# Patient Record
Sex: FEMALE | Race: WHITE | Employment: UNEMPLOYED | URBAN - METROPOLITAN AREA
[De-identification: names, ages, dates, MRNs, and addresses within clinical notes are randomized per-mention and may not be internally consistent; named-entity substitution may affect disease eponyms.]

---

## 2017-01-16 ENCOUNTER — TELEPHONE (OUTPATIENT)
Dept: CARDIOLOGY | Age: 37
End: 2017-01-16

## 2017-01-16 RX ORDER — LISINOPRIL 20 MG/1
20 TABLET ORAL DAILY
Qty: 30 TABLET | Refills: 0 | Status: SHIPPED | OUTPATIENT
Start: 2017-01-16 | End: 2018-09-05 | Stop reason: ALTCHOICE

## 2018-09-05 ENCOUNTER — HOSPITAL ENCOUNTER (INPATIENT)
Age: 38
LOS: 4 days | Discharge: HOME | DRG: 194 | End: 2018-09-09
Attending: EMERGENCY MEDICINE | Admitting: FAMILY MEDICINE
Payer: MEDICAID

## 2018-09-05 ENCOUNTER — APPOINTMENT (OUTPATIENT)
Dept: GENERAL RADIOLOGY | Age: 38
DRG: 194 | End: 2018-09-05
Payer: MEDICAID

## 2018-09-05 ENCOUNTER — APPOINTMENT (OUTPATIENT)
Dept: ULTRASOUND IMAGING | Age: 38
DRG: 194 | End: 2018-09-05
Payer: MEDICAID

## 2018-09-05 DIAGNOSIS — I16.0 HYPERTENSIVE URGENCY: ICD-10-CM

## 2018-09-05 DIAGNOSIS — R73.01 ELEVATED FASTING GLUCOSE: ICD-10-CM

## 2018-09-05 DIAGNOSIS — I50.9 ACUTE CONGESTIVE HEART FAILURE, UNSPECIFIED HEART FAILURE TYPE (HCC): Primary | ICD-10-CM

## 2018-09-05 DIAGNOSIS — I16.1 HYPERTENSIVE EMERGENCY: ICD-10-CM

## 2018-09-05 DIAGNOSIS — J81.0 ACUTE PULMONARY EDEMA (HCC): ICD-10-CM

## 2018-09-05 PROBLEM — D64.9 ANEMIA: Status: ACTIVE | Noted: 2018-09-05

## 2018-09-05 PROBLEM — R06.00 DYSPNEA: Status: ACTIVE | Noted: 2018-09-05

## 2018-09-05 PROBLEM — I50.33 ACUTE ON CHRONIC DIASTOLIC HEART FAILURE (HCC): Status: ACTIVE | Noted: 2018-09-05

## 2018-09-05 PROBLEM — E28.2 POLYCYSTIC OVARIAN DISEASE: Status: ACTIVE | Noted: 2018-09-05

## 2018-09-05 PROBLEM — R60.9 EDEMA: Status: ACTIVE | Noted: 2018-09-05

## 2018-09-05 PROBLEM — E66.9 OBESITY: Status: ACTIVE | Noted: 2018-09-05

## 2018-09-05 LAB
A/G RATIO: 1.3 (ref 1.1–2.2)
ALBUMIN SERPL-MCNC: 4 G/DL (ref 3.4–5)
ALP BLD-CCNC: 74 U/L (ref 40–129)
ALT SERPL-CCNC: 8 U/L (ref 10–40)
AMPHETAMINE SCREEN, URINE: NORMAL
ANION GAP SERPL CALCULATED.3IONS-SCNC: 14 MMOL/L (ref 3–16)
AST SERPL-CCNC: 14 U/L (ref 15–37)
BARBITURATE SCREEN URINE: NORMAL
BASOPHILS ABSOLUTE: 0.1 K/UL (ref 0–0.2)
BASOPHILS RELATIVE PERCENT: 0.8 %
BENZODIAZEPINE SCREEN, URINE: NORMAL
BILIRUB SERPL-MCNC: 1.3 MG/DL (ref 0–1)
BILIRUBIN URINE: NEGATIVE
BLOOD, URINE: NEGATIVE
BUN BLDV-MCNC: 19 MG/DL (ref 7–20)
CALCIUM SERPL-MCNC: 9.1 MG/DL (ref 8.3–10.6)
CANNABINOID SCREEN URINE: NORMAL
CHLORIDE BLD-SCNC: 103 MMOL/L (ref 99–110)
CLARITY: CLEAR
CO2: 21 MMOL/L (ref 21–32)
COCAINE METABOLITE SCREEN URINE: NORMAL
COLOR: NORMAL
CREAT SERPL-MCNC: 1 MG/DL (ref 0.6–1.1)
CREATININE URINE: 8.8 MG/DL (ref 28–259)
EOSINOPHILS ABSOLUTE: 0.1 K/UL (ref 0–0.6)
EOSINOPHILS RELATIVE PERCENT: 0.8 %
FOLATE: 12.48 NG/ML (ref 4.78–24.2)
GFR AFRICAN AMERICAN: >60
GFR NON-AFRICAN AMERICAN: >60
GLOBULIN: 3.1 G/DL
GLUCOSE BLD-MCNC: 126 MG/DL (ref 70–99)
GLUCOSE URINE: NEGATIVE MG/DL
GONADOTROPIN, CHORIONIC (HCG) QUANT: <5 MIU/ML
HCG QUALITATIVE: NEGATIVE
HCT VFR BLD CALC: 33.3 % (ref 36–48)
HEMOGLOBIN: 9.6 G/DL (ref 12–16)
IRON SATURATION: 6 % (ref 15–50)
IRON: 27 UG/DL (ref 37–145)
KETONES, URINE: NEGATIVE MG/DL
LEUKOCYTE ESTERASE, URINE: NEGATIVE
LYMPHOCYTES ABSOLUTE: 1.1 K/UL (ref 1–5.1)
LYMPHOCYTES RELATIVE PERCENT: 9.5 %
Lab: NORMAL
MCH RBC QN AUTO: 20.7 PG (ref 26–34)
MCHC RBC AUTO-ENTMCNC: 29 G/DL (ref 31–36)
MCV RBC AUTO: 71.3 FL (ref 80–100)
METHADONE SCREEN, URINE: NORMAL
MICROALBUMIN UR-MCNC: 4.6 MG/DL
MICROALBUMIN/CREAT UR-RTO: 522.7 MG/G (ref 0–30)
MICROSCOPIC EXAMINATION: NORMAL
MONOCYTES ABSOLUTE: 0.6 K/UL (ref 0–1.3)
MONOCYTES RELATIVE PERCENT: 5.4 %
NEUTROPHILS ABSOLUTE: 9.9 K/UL (ref 1.7–7.7)
NEUTROPHILS RELATIVE PERCENT: 83.5 %
NITRITE, URINE: NEGATIVE
OPIATE SCREEN URINE: NORMAL
OXYCODONE URINE: NORMAL
PDW BLD-RTO: 22.9 % (ref 12.4–15.4)
PH UA: 6.5
PH UA: 7
PHENCYCLIDINE SCREEN URINE: NORMAL
PLATELET # BLD: 244 K/UL (ref 135–450)
PMV BLD AUTO: 9.2 FL (ref 5–10.5)
POTASSIUM SERPL-SCNC: 4.2 MMOL/L (ref 3.5–5.1)
PRO-BNP: 4762 PG/ML (ref 0–124)
PROPOXYPHENE SCREEN: NORMAL
PROTEIN UA: NEGATIVE MG/DL
RBC # BLD: 4.66 M/UL (ref 4–5.2)
SODIUM BLD-SCNC: 138 MMOL/L (ref 136–145)
SPECIFIC GRAVITY UA: <=1.005
TOTAL IRON BINDING CAPACITY: 444 UG/DL (ref 260–445)
TOTAL PROTEIN: 7.1 G/DL (ref 6.4–8.2)
TROPONIN: <0.01 NG/ML
URINE TYPE: NORMAL
UROBILINOGEN, URINE: 0.2 E.U./DL
VITAMIN B-12: 726 PG/ML (ref 211–911)
WBC # BLD: 11.8 K/UL (ref 4–11)

## 2018-09-05 PROCEDURE — 99285 EMERGENCY DEPT VISIT HI MDM: CPT

## 2018-09-05 PROCEDURE — 99291 CRITICAL CARE FIRST HOUR: CPT | Performed by: INTERNAL MEDICINE

## 2018-09-05 PROCEDURE — 6360000002 HC RX W HCPCS: Performed by: NURSE PRACTITIONER

## 2018-09-05 PROCEDURE — 6370000000 HC RX 637 (ALT 250 FOR IP): Performed by: INTERNAL MEDICINE

## 2018-09-05 PROCEDURE — 6370000000 HC RX 637 (ALT 250 FOR IP): Performed by: NURSE PRACTITIONER

## 2018-09-05 PROCEDURE — 71046 X-RAY EXAM CHEST 2 VIEWS: CPT

## 2018-09-05 PROCEDURE — 83735 ASSAY OF MAGNESIUM: CPT

## 2018-09-05 PROCEDURE — 2000000000 HC ICU R&B

## 2018-09-05 PROCEDURE — 6370000000 HC RX 637 (ALT 250 FOR IP): Performed by: EMERGENCY MEDICINE

## 2018-09-05 PROCEDURE — 2500000003 HC RX 250 WO HCPCS: Performed by: NURSE PRACTITIONER

## 2018-09-05 PROCEDURE — 6360000002 HC RX W HCPCS: Performed by: EMERGENCY MEDICINE

## 2018-09-05 PROCEDURE — 96374 THER/PROPH/DIAG INJ IV PUSH: CPT

## 2018-09-05 PROCEDURE — 83540 ASSAY OF IRON: CPT

## 2018-09-05 PROCEDURE — 2580000003 HC RX 258: Performed by: FAMILY MEDICINE

## 2018-09-05 PROCEDURE — 2500000003 HC RX 250 WO HCPCS: Performed by: FAMILY MEDICINE

## 2018-09-05 PROCEDURE — 84270 ASSAY OF SEX HORMONE GLOBUL: CPT

## 2018-09-05 PROCEDURE — 83001 ASSAY OF GONADOTROPIN (FSH): CPT

## 2018-09-05 PROCEDURE — 36415 COLL VENOUS BLD VENIPUNCTURE: CPT

## 2018-09-05 PROCEDURE — 2500000003 HC RX 250 WO HCPCS: Performed by: INTERNAL MEDICINE

## 2018-09-05 PROCEDURE — 83036 HEMOGLOBIN GLYCOSYLATED A1C: CPT

## 2018-09-05 PROCEDURE — 80053 COMPREHEN METABOLIC PANEL: CPT

## 2018-09-05 PROCEDURE — 80307 DRUG TEST PRSMV CHEM ANLYZR: CPT

## 2018-09-05 PROCEDURE — 76770 US EXAM ABDO BACK WALL COMP: CPT

## 2018-09-05 PROCEDURE — 82607 VITAMIN B-12: CPT

## 2018-09-05 PROCEDURE — 83880 ASSAY OF NATRIURETIC PEPTIDE: CPT

## 2018-09-05 PROCEDURE — S0028 INJECTION, FAMOTIDINE, 20 MG: HCPCS | Performed by: NURSE PRACTITIONER

## 2018-09-05 PROCEDURE — 83550 IRON BINDING TEST: CPT

## 2018-09-05 PROCEDURE — 82043 UR ALBUMIN QUANTITATIVE: CPT

## 2018-09-05 PROCEDURE — 93005 ELECTROCARDIOGRAM TRACING: CPT | Performed by: EMERGENCY MEDICINE

## 2018-09-05 PROCEDURE — 81003 URINALYSIS AUTO W/O SCOPE: CPT

## 2018-09-05 PROCEDURE — 84702 CHORIONIC GONADOTROPIN TEST: CPT

## 2018-09-05 PROCEDURE — 84146 ASSAY OF PROLACTIN: CPT

## 2018-09-05 PROCEDURE — 84484 ASSAY OF TROPONIN QUANT: CPT

## 2018-09-05 PROCEDURE — 85025 COMPLETE CBC W/AUTO DIFF WBC: CPT

## 2018-09-05 PROCEDURE — 84703 CHORIONIC GONADOTROPIN ASSAY: CPT

## 2018-09-05 PROCEDURE — 82746 ASSAY OF FOLIC ACID SERUM: CPT

## 2018-09-05 PROCEDURE — 82570 ASSAY OF URINE CREATININE: CPT

## 2018-09-05 PROCEDURE — 83002 ASSAY OF GONADOTROPIN (LH): CPT

## 2018-09-05 PROCEDURE — 2580000003 HC RX 258: Performed by: NURSE PRACTITIONER

## 2018-09-05 PROCEDURE — 84403 ASSAY OF TOTAL TESTOSTERONE: CPT

## 2018-09-05 PROCEDURE — 6360000002 HC RX W HCPCS: Performed by: INTERNAL MEDICINE

## 2018-09-05 RX ORDER — ACETAMINOPHEN 325 MG/1
650 TABLET ORAL ONCE
Status: COMPLETED | OUTPATIENT
Start: 2018-09-05 | End: 2018-09-05

## 2018-09-05 RX ORDER — FUROSEMIDE 10 MG/ML
20 INJECTION INTRAMUSCULAR; INTRAVENOUS ONCE
Status: COMPLETED | OUTPATIENT
Start: 2018-09-05 | End: 2018-09-05

## 2018-09-05 RX ORDER — CLONIDINE HYDROCHLORIDE 0.1 MG/1
0.1 TABLET ORAL 3 TIMES DAILY
Status: DISCONTINUED | OUTPATIENT
Start: 2018-09-05 | End: 2018-09-09 | Stop reason: HOSPADM

## 2018-09-05 RX ORDER — FUROSEMIDE 10 MG/ML
40 INJECTION INTRAMUSCULAR; INTRAVENOUS 2 TIMES DAILY
Status: DISCONTINUED | OUTPATIENT
Start: 2018-09-05 | End: 2018-09-08

## 2018-09-05 RX ORDER — CALCIUM CARBONATE 200(500)MG
500 TABLET,CHEWABLE ORAL 3 TIMES DAILY PRN
Status: DISCONTINUED | OUTPATIENT
Start: 2018-09-05 | End: 2018-09-09 | Stop reason: HOSPADM

## 2018-09-05 RX ORDER — MORPHINE SULFATE 2 MG/ML
2 INJECTION, SOLUTION INTRAMUSCULAR; INTRAVENOUS ONCE
Status: COMPLETED | OUTPATIENT
Start: 2018-09-05 | End: 2018-09-05

## 2018-09-05 RX ORDER — HYDRALAZINE HYDROCHLORIDE 20 MG/ML
5 INJECTION INTRAMUSCULAR; INTRAVENOUS ONCE
Status: COMPLETED | OUTPATIENT
Start: 2018-09-05 | End: 2018-09-05

## 2018-09-05 RX ORDER — SODIUM CHLORIDE 0.9 % (FLUSH) 0.9 %
10 SYRINGE (ML) INJECTION PRN
Status: DISCONTINUED | OUTPATIENT
Start: 2018-09-05 | End: 2018-09-09 | Stop reason: HOSPADM

## 2018-09-05 RX ORDER — LABETALOL HYDROCHLORIDE 5 MG/ML
20 INJECTION, SOLUTION INTRAVENOUS EVERY 4 HOURS PRN
Status: DISCONTINUED | OUTPATIENT
Start: 2018-09-05 | End: 2018-09-07

## 2018-09-05 RX ORDER — HYDRALAZINE HYDROCHLORIDE 20 MG/ML
10 INJECTION INTRAMUSCULAR; INTRAVENOUS EVERY 6 HOURS PRN
Status: DISCONTINUED | OUTPATIENT
Start: 2018-09-05 | End: 2018-09-09 | Stop reason: HOSPADM

## 2018-09-05 RX ORDER — CAPSAICIN 0.025 %
CREAM (GRAM) TOPICAL 2 TIMES DAILY
Status: DISCONTINUED | OUTPATIENT
Start: 2018-09-05 | End: 2018-09-09 | Stop reason: HOSPADM

## 2018-09-05 RX ORDER — ONDANSETRON 2 MG/ML
4 INJECTION INTRAMUSCULAR; INTRAVENOUS EVERY 6 HOURS PRN
Status: DISCONTINUED | OUTPATIENT
Start: 2018-09-05 | End: 2018-09-09 | Stop reason: HOSPADM

## 2018-09-05 RX ORDER — SPIRONOLACTONE 25 MG/1
25 TABLET ORAL DAILY
Status: DISCONTINUED | OUTPATIENT
Start: 2018-09-05 | End: 2018-09-07

## 2018-09-05 RX ORDER — SODIUM CHLORIDE 0.9 % (FLUSH) 0.9 %
10 SYRINGE (ML) INJECTION EVERY 12 HOURS SCHEDULED
Status: DISCONTINUED | OUTPATIENT
Start: 2018-09-05 | End: 2018-09-09 | Stop reason: HOSPADM

## 2018-09-05 RX ADMIN — HYDRALAZINE HYDROCHLORIDE 5 MG: 20 INJECTION INTRAMUSCULAR; INTRAVENOUS at 13:38

## 2018-09-05 RX ADMIN — SODIUM CHLORIDE, PRESERVATIVE FREE 10 ML: 5 INJECTION INTRAVENOUS at 20:19

## 2018-09-05 RX ADMIN — FUROSEMIDE 20 MG: 10 INJECTION, SOLUTION INTRAMUSCULAR; INTRAVENOUS at 11:52

## 2018-09-05 RX ADMIN — NITROGLYCERIN 0.5 INCH: 20 OINTMENT TOPICAL at 12:32

## 2018-09-05 RX ADMIN — FAMOTIDINE 20 MG: 10 INJECTION, SOLUTION INTRAVENOUS at 20:19

## 2018-09-05 RX ADMIN — ACETAMINOPHEN 650 MG: 325 TABLET, FILM COATED ORAL at 13:38

## 2018-09-05 RX ADMIN — SPIRONOLACTONE 25 MG: 25 TABLET ORAL at 16:45

## 2018-09-05 RX ADMIN — LABETALOL HYDROCHLORIDE 20 MG: 5 INJECTION, SOLUTION INTRAVENOUS at 18:02

## 2018-09-05 RX ADMIN — ONDANSETRON HYDROCHLORIDE 4 MG: 2 INJECTION, SOLUTION INTRAMUSCULAR; INTRAVENOUS at 15:40

## 2018-09-05 RX ADMIN — ANTACID TABLETS 500 MG: 500 TABLET, CHEWABLE ORAL at 19:16

## 2018-09-05 RX ADMIN — ENOXAPARIN SODIUM 40 MG: 40 INJECTION SUBCUTANEOUS at 16:45

## 2018-09-05 RX ADMIN — FUROSEMIDE 40 MG: 10 INJECTION, SOLUTION INTRAMUSCULAR; INTRAVENOUS at 20:19

## 2018-09-05 RX ADMIN — MORPHINE SULFATE 2 MG: 2 INJECTION, SOLUTION INTRAMUSCULAR; INTRAVENOUS at 15:55

## 2018-09-05 RX ADMIN — DEXTROSE MONOHYDRATE 8 MG/HR: 50 INJECTION, SOLUTION INTRAVENOUS at 19:55

## 2018-09-05 RX ADMIN — CLONIDINE HYDROCHLORIDE 0.1 MG: 0.1 TABLET ORAL at 20:19

## 2018-09-05 RX ADMIN — DEXTROSE MONOHYDRATE 5 MG/HR: 50 INJECTION, SOLUTION INTRAVENOUS at 16:22

## 2018-09-05 ASSESSMENT — ENCOUNTER SYMPTOMS
PHOTOPHOBIA: 0
SPUTUM PRODUCTION: 0
SHORTNESS OF BREATH: 1
ABDOMINAL PAIN: 0
ORTHOPNEA: 0
COUGH: 0
BACK PAIN: 0
HEARTBURN: 0
DOUBLE VISION: 0
BLURRED VISION: 0
VOMITING: 0
NAUSEA: 0

## 2018-09-05 ASSESSMENT — PAIN DESCRIPTION - PAIN TYPE
TYPE: ACUTE PAIN
TYPE: ACUTE PAIN

## 2018-09-05 ASSESSMENT — PAIN DESCRIPTION - ORIENTATION: ORIENTATION: MID

## 2018-09-05 ASSESSMENT — PAIN DESCRIPTION - FREQUENCY: FREQUENCY: CONTINUOUS

## 2018-09-05 ASSESSMENT — PAIN SCALES - GENERAL
PAINLEVEL_OUTOF10: 7
PAINLEVEL_OUTOF10: 5
PAINLEVEL_OUTOF10: 0
PAINLEVEL_OUTOF10: 5

## 2018-09-05 ASSESSMENT — PAIN DESCRIPTION - LOCATION
LOCATION: HEAD
LOCATION: HEAD

## 2018-09-05 ASSESSMENT — PAIN DESCRIPTION - PROGRESSION: CLINICAL_PROGRESSION: NOT CHANGED

## 2018-09-05 ASSESSMENT — PAIN DESCRIPTION - DESCRIPTORS: DESCRIPTORS: ACHING

## 2018-09-05 ASSESSMENT — PAIN DESCRIPTION - ONSET: ONSET: SUDDEN

## 2018-09-05 NOTE — PROGRESS NOTES
Pt. Assessed, up to chair, a/o x4, vss on RA, cardene drip infusing @ 8mg/hr, will titrate as able. Pt. verbalizing no needs at this time.

## 2018-09-05 NOTE — CONSULTS
and nosebleeds. Eyes: Negative for blurred vision, double vision and photophobia. Respiratory: Positive for shortness of breath. Negative for cough and sputum production. Cardiovascular: Negative for chest pain, palpitations and orthopnea. Gastrointestinal: Negative for abdominal pain, heartburn, nausea and vomiting. Genitourinary: Negative for dysuria, frequency and urgency. Musculoskeletal: Negative for back pain, myalgias and neck pain. Neurological: Negative for dizziness, focal weakness, loss of consciousness and headaches. Endo/Heme/Allergies: Negative for environmental allergies and polydipsia. Does not bruise/bleed easily. Objective:   PHYSICAL EXAM:  BP (!) 190/104   Pulse 85   Temp 97.5 °F (36.4 °C) (Oral)   Resp 22   Ht 5' 7\" (1.702 m)   Wt (!) 320 lb 5.3 oz (145.3 kg)   LMP 08/27/2018 (Approximate) Comment: 2 weeks ago   SpO2 98%   BMI 50.17 kg/m²    Physical Exam   Constitutional: She appears well-developed and well-nourished. No distress. HENT:   Head: Normocephalic and atraumatic. Mouth/Throat: Oropharynx is clear and moist. No oropharyngeal exudate. Eyes: Pupils are equal, round, and reactive to light. EOM are normal.   Neck: Neck supple. No JVD present. Cardiovascular: Normal heart sounds. Exam reveals no gallop and no friction rub. No murmur heard. Pulmonary/Chest: Effort normal. She has no wheezes. She has no rales. Equal chest rise and expansion bilaterally   Abdominal: Soft. Bowel sounds are normal. She exhibits no distension. There is no tenderness. Musculoskeletal: Normal range of motion. She exhibits no edema. Lymphadenopathy:     She has no cervical adenopathy. Neurological: She is alert. No cranial nerve deficit. CN 2-12 grossly intact   Skin: Skin is warm and dry. No rash noted. She is not diaphoretic.           Data Reviewed:   LABS:  CBC:   Recent Labs      09/05/18   0934   WBC  11.8*   HGB  9.6*   HCT  33.3*   MCV  71.3*   PLT  244

## 2018-09-05 NOTE — H&P
Hospital Medicine History & Physical      PCP: No primary care provider on file. Date of Admission: 9/5/2018    Date of Service: Pt seen/examined on 9/5/18 and Admitted to Inpatient with expected LOS greater than two midnights due to medical therapy. Chief Complaint:  Shortness of breath and le swelling       History Of Present Illness:    43 y.o. female  Who previously was on medications for HTN approximately 2 years ago when she lost her insurance and PCP. She has not been on any medication for the last 2 years. She also has a known history of polycystic ovarian disease. She presented to the [facility] emergency department today with shortness of breath and lower extremity swelling she has noticed for the last several months she's been progressively more short of breath to the point now she cannot lay flat night. She denies any fevers chills nausea vomiting or diarrhea she has an increased fatigue no blurred vision and no headaches. No chest pain. ED workup revealed chest x-ray with pulmonary edema BMP was greater than 4000. Initial blood pressures to 952-227 systolically. ECG was nonacute initial troponin was negative. She was given when necessary hydralazine and started on a nitroglycerin patch however after an hour this made little difference. Hospitalist service was asked to admit for further evaluation and treatment. The patient was admitted to the ICU and started on a narcotic pain drip because she reported headache with nitroglycerin. Past Medical History:          Diagnosis Date    Hypertension     Obesity        Past Surgical History:      History reviewed. No pertinent surgical history. Medications Prior to Admission:      Prior to Admission medications    Not on File       Allergies:  Patient has no known allergies. Social History:      The patient currently lives Independently    TOBACCO:   reports that she has quit smoking. Her smoking use included Cigarettes. She quit smokeless tobacco use about 6 years ago. ETOH:   reports that she does not drink alcohol. Family History:     Reviewed in detail and negative for DM, CAD, Cancer, CVA. Positive as follows:    Family History   Problem Relation Age of Onset    High Blood Pressure Father        REVIEW OF SYSTEMS:   Pertinent positives as noted in the HPI. All other systems reviewed and negative. PHYSICAL EXAM PERFORMED:    BP (!) 227/128   Pulse 94   Temp 97.5 °F (36.4 °C) (Oral)   Resp 22   Ht 5' 7\" (1.702 m)   Wt (!) 320 lb 5.3 oz (145.3 kg)   LMP 08/27/2018 (Approximate) Comment: 2 weeks ago   SpO2 98%   BMI 50.17 kg/m²     General appearance: Young morbidly obese female sitting on side of bed in no acute distress  HEENT:  Normal cephalic, atraumatic without obvious deformity. Pupils equal, round, and reactive to light. Extra ocular muscles intact. Conjunctivae/corneas clear. Neck: Supple, with full range of motion. + jugular venous distention. Trachea midline. Respiratory:  GAYLE and at rest with bilateral crackles  Cardiovascular:  Regular rate and rhythm with normal S1/S2 without murmurs, rubs or gallops. Abdomen: Protruding, taunt, non-tender, distended with normal bowel sounds. Musculoskeletal:  No clubbing, cyanosis or edema bilaterally. Full range of motion without deformity. Skin:   Neurologic:  Neurovascularly intact without any focal sensory/motor deficits.  Cranial nerves: II-XII intact, grossly non-focal.  Psychiatric:  Alert and oriented, thought content appropriate, normal insight  Capillary Refill: Brisk,< 3 seconds   Peripheral Pulses: +2 palpable, equal bilaterally       Labs:     Recent Labs      09/05/18   0934   WBC  11.8*   HGB  9.6*   HCT  33.3*   PLT  244     Recent Labs      09/05/18   0934   NA  138   K  4.2   CL  103   CO2  21   BUN  19   CREATININE  1.0   CALCIUM  9.1     Recent Labs      09/05/18   0934   AST  14*   ALT  8*   BILITOT  1.3*   ALKPHOS  74     No results for periods now was previously on metformin   - will defer to OG/Gyn as OP     Hyperglycemia likely metabolic syndrome/ PSO  - check A1C     Morbid Obesity - BMI  Complicating assessment and treatment. Placing patient at risk for multiple co-morbidities as well as early death and contributing to the patient's presentation. Counseled on weight loss. DVT Prophylaxis: Lovenox   Diet: DIET LOW SODIUM 2 GM; 2000 ml  Code Status: Full Code    PT/OT Eval Status: NA  Will need to make arrangments for follow up at [address] - pending work up up and clinical course        Lore Deleon, APRN - CNP    Thank you No primary care provider on file. for the opportunity to be involved in this patient's care. If you have any questions or concerns please feel free to contact me at [phone].

## 2018-09-05 NOTE — ED PROVIDER NOTES
period 08/06/2018, SpO2 96 %. Disposition:  Admit to telemetry in guarded condition. The total Critical Care time is 45 minutes which excludes separately billable procedures. This chart was generated in part by using Dragon Dictation system and may contain errors related to that system including errors in grammar, punctuation, and spelling, as well as words and phrases that may be inappropriate. If there are any questions or concerns please feel free to contact the dictating provider for clarification.      Sharon Flores MD  [address] [place] [address] Lisa Ring MD  09/05/18 7879

## 2018-09-05 NOTE — CARE COORDINATION
Reviewed the patient record for inpatient criteria under MCG per request of Berta Jimenez, [facility]. Case meets inpatient criteria under HTN.

## 2018-09-05 NOTE — ED NOTES
Clarified with admitting CNP, patient to go to ICU and informed that DNRcc is ordered on patient which needs corrected.       Catie Fay RN  09/05/18 4704

## 2018-09-05 NOTE — PROGRESS NOTES
4 Eyes Skin Assessment     The patient is being assess for   Shift Handoff    I agree that 2 RN's have performed a thorough Head to Toe Skin Assessment on the patient. ALL assessment sites listed below have been assessed. Areas assessed by both nurses:   [x]   Head, Face, and Ears   [x]   Shoulders, Back, and Chest, Abdomen  [x]   Arms, Elbows, and Hands   [x]   Coccyx, Sacrum, and Ischium  [x]   Legs, Feet, and Heels          **SHARE this note so that the co-signing nurse is able to place an eSignature**    Co-signer eSignature: Electronically signed by Shelly Montano RN on 9/5/18 at 7:17 PM    Does the Patient have Skin Breakdown?   No          Quang Prevention initiated:  NA   Wound Care Orders initiated:  NA      WOC nurse consulted for Pressure Injury (Stage 3,4, Unstageable, DTI, NWPT, Complex wounds)and New or Established Ostomies:  NA      Primary Nurse eSignature: MA, RN

## 2018-09-06 LAB
ALBUMIN SERPL-MCNC: 4 G/DL (ref 3.4–5)
ANION GAP SERPL CALCULATED.3IONS-SCNC: 12 MMOL/L (ref 3–16)
ANION GAP SERPL CALCULATED.3IONS-SCNC: 12 MMOL/L (ref 3–16)
BUN BLDV-MCNC: 15 MG/DL (ref 7–20)
BUN BLDV-MCNC: 16 MG/DL (ref 7–20)
CALCIUM SERPL-MCNC: 8.7 MG/DL (ref 8.3–10.6)
CALCIUM SERPL-MCNC: 9.2 MG/DL (ref 8.3–10.6)
CHLORIDE BLD-SCNC: 100 MMOL/L (ref 99–110)
CHLORIDE BLD-SCNC: 97 MMOL/L (ref 99–110)
CHOLESTEROL, TOTAL: 92 MG/DL (ref 0–199)
CO2: 23 MMOL/L (ref 21–32)
CO2: 24 MMOL/L (ref 21–32)
CORTISOL - AM: 23.2 UG/DL (ref 4.3–22.4)
CREAT SERPL-MCNC: 0.9 MG/DL (ref 0.6–1.1)
CREAT SERPL-MCNC: 0.9 MG/DL (ref 0.6–1.1)
EKG ATRIAL RATE: 106 BPM
EKG DIAGNOSIS: NORMAL
EKG P AXIS: 24 DEGREES
EKG P-R INTERVAL: 148 MS
EKG Q-T INTERVAL: 356 MS
EKG QRS DURATION: 88 MS
EKG QTC CALCULATION (BAZETT): 472 MS
EKG R AXIS: -5 DEGREES
EKG T AXIS: 98 DEGREES
EKG VENTRICULAR RATE: 106 BPM
ESTIMATED AVERAGE GLUCOSE: 119.8 MG/DL
FOLLICLE STIMULATING HORMONE: 5.3 MIU/ML
GFR AFRICAN AMERICAN: >60
GFR AFRICAN AMERICAN: >60
GFR NON-AFRICAN AMERICAN: >60
GFR NON-AFRICAN AMERICAN: >60
GLUCOSE BLD-MCNC: 121 MG/DL (ref 70–99)
GLUCOSE BLD-MCNC: 133 MG/DL (ref 70–99)
HBA1C MFR BLD: 5.8 %
HCT VFR BLD CALC: 32.6 % (ref 36–48)
HDLC SERPL-MCNC: 32 MG/DL (ref 40–60)
HEMOGLOBIN: 9.8 G/DL (ref 12–16)
LDL CHOLESTEROL CALCULATED: 51 MG/DL
LUTEINIZING HORMONE: 11.3 MIU/ML
LV EF: 55 %
LVEF MODALITY: NORMAL
MAGNESIUM: 2.2 MG/DL (ref 1.8–2.4)
MCH RBC QN AUTO: 21.4 PG (ref 26–34)
MCHC RBC AUTO-ENTMCNC: 30.1 G/DL (ref 31–36)
MCV RBC AUTO: 71.2 FL (ref 80–100)
PDW BLD-RTO: 22.1 % (ref 12.4–15.4)
PHOSPHORUS: 3.5 MG/DL (ref 2.5–4.9)
PLATELET # BLD: 225 K/UL (ref 135–450)
PMV BLD AUTO: 9 FL (ref 5–10.5)
POTASSIUM REFLEX MAGNESIUM: 4.2 MMOL/L (ref 3.5–5.1)
POTASSIUM SERPL-SCNC: 3.6 MMOL/L (ref 3.5–5.1)
PRO-BNP: 1733 PG/ML (ref 0–124)
PROLACTIN: 15.3 NG/ML
RBC # BLD: 4.57 M/UL (ref 4–5.2)
SODIUM BLD-SCNC: 132 MMOL/L (ref 136–145)
SODIUM BLD-SCNC: 136 MMOL/L (ref 136–145)
TRIGL SERPL-MCNC: 46 MG/DL (ref 0–150)
TSH REFLEX: 2.29 UIU/ML (ref 0.27–4.2)
VLDLC SERPL CALC-MCNC: 9 MG/DL
WBC # BLD: 11.8 K/UL (ref 4–11)

## 2018-09-06 PROCEDURE — 2580000003 HC RX 258: Performed by: FAMILY MEDICINE

## 2018-09-06 PROCEDURE — 6360000002 HC RX W HCPCS: Performed by: NURSE PRACTITIONER

## 2018-09-06 PROCEDURE — S0028 INJECTION, FAMOTIDINE, 20 MG: HCPCS | Performed by: NURSE PRACTITIONER

## 2018-09-06 PROCEDURE — 2500000003 HC RX 250 WO HCPCS: Performed by: INTERNAL MEDICINE

## 2018-09-06 PROCEDURE — 82088 ASSAY OF ALDOSTERONE: CPT

## 2018-09-06 PROCEDURE — 85027 COMPLETE CBC AUTOMATED: CPT

## 2018-09-06 PROCEDURE — 1200000000 HC SEMI PRIVATE

## 2018-09-06 PROCEDURE — 80061 LIPID PANEL: CPT

## 2018-09-06 PROCEDURE — 2500000003 HC RX 250 WO HCPCS: Performed by: NURSE PRACTITIONER

## 2018-09-06 PROCEDURE — 83880 ASSAY OF NATRIURETIC PEPTIDE: CPT

## 2018-09-06 PROCEDURE — 84443 ASSAY THYROID STIM HORMONE: CPT

## 2018-09-06 PROCEDURE — 6360000002 HC RX W HCPCS: Performed by: INTERNAL MEDICINE

## 2018-09-06 PROCEDURE — 36415 COLL VENOUS BLD VENIPUNCTURE: CPT

## 2018-09-06 PROCEDURE — 2580000003 HC RX 258: Performed by: NURSE PRACTITIONER

## 2018-09-06 PROCEDURE — 84244 ASSAY OF RENIN: CPT

## 2018-09-06 PROCEDURE — 82533 TOTAL CORTISOL: CPT

## 2018-09-06 PROCEDURE — 93975 VASCULAR STUDY: CPT

## 2018-09-06 PROCEDURE — 99255 IP/OBS CONSLTJ NEW/EST HI 80: CPT | Performed by: INTERNAL MEDICINE

## 2018-09-06 PROCEDURE — 83835 ASSAY OF METANEPHRINES: CPT

## 2018-09-06 PROCEDURE — 99232 SBSQ HOSP IP/OBS MODERATE 35: CPT | Performed by: INTERNAL MEDICINE

## 2018-09-06 PROCEDURE — 80048 BASIC METABOLIC PNL TOTAL CA: CPT

## 2018-09-06 PROCEDURE — 6370000000 HC RX 637 (ALT 250 FOR IP): Performed by: HOSPITALIST

## 2018-09-06 PROCEDURE — 93010 ELECTROCARDIOGRAM REPORT: CPT | Performed by: INTERNAL MEDICINE

## 2018-09-06 PROCEDURE — 6370000000 HC RX 637 (ALT 250 FOR IP): Performed by: INTERNAL MEDICINE

## 2018-09-06 PROCEDURE — 2500000003 HC RX 250 WO HCPCS: Performed by: FAMILY MEDICINE

## 2018-09-06 PROCEDURE — C8929 TTE W OR WO FOL WCON,DOPPLER: HCPCS

## 2018-09-06 RX ORDER — POTASSIUM CHLORIDE 20 MEQ/1
40 TABLET, EXTENDED RELEASE ORAL ONCE
Status: COMPLETED | OUTPATIENT
Start: 2018-09-06 | End: 2018-09-06

## 2018-09-06 RX ORDER — ACETAMINOPHEN 325 MG/1
650 TABLET ORAL EVERY 4 HOURS PRN
Status: DISCONTINUED | OUTPATIENT
Start: 2018-09-06 | End: 2018-09-09 | Stop reason: HOSPADM

## 2018-09-06 RX ADMIN — LABETALOL HYDROCHLORIDE 20 MG: 5 INJECTION, SOLUTION INTRAVENOUS at 00:37

## 2018-09-06 RX ADMIN — LABETALOL HYDROCHLORIDE 20 MG: 5 INJECTION, SOLUTION INTRAVENOUS at 04:41

## 2018-09-06 RX ADMIN — ACETAMINOPHEN 650 MG: 325 TABLET, FILM COATED ORAL at 20:26

## 2018-09-06 RX ADMIN — SODIUM CHLORIDE, PRESERVATIVE FREE 10 ML: 5 INJECTION INTRAVENOUS at 19:56

## 2018-09-06 RX ADMIN — POTASSIUM CHLORIDE 40 MEQ: 1500 TABLET, EXTENDED RELEASE ORAL at 00:36

## 2018-09-06 RX ADMIN — CLONIDINE HYDROCHLORIDE 0.1 MG: 0.1 TABLET ORAL at 13:59

## 2018-09-06 RX ADMIN — FAMOTIDINE 20 MG: 10 INJECTION, SOLUTION INTRAVENOUS at 19:56

## 2018-09-06 RX ADMIN — DEXTROSE MONOHYDRATE 5 MG/HR: 50 INJECTION, SOLUTION INTRAVENOUS at 00:34

## 2018-09-06 RX ADMIN — FAMOTIDINE 20 MG: 10 INJECTION, SOLUTION INTRAVENOUS at 07:54

## 2018-09-06 RX ADMIN — LABETALOL HYDROCHLORIDE 20 MG: 5 INJECTION, SOLUTION INTRAVENOUS at 13:05

## 2018-09-06 RX ADMIN — FUROSEMIDE 40 MG: 10 INJECTION, SOLUTION INTRAMUSCULAR; INTRAVENOUS at 19:56

## 2018-09-06 RX ADMIN — CAPSAICIN: 0.25 CREAM TOPICAL at 06:20

## 2018-09-06 RX ADMIN — CLONIDINE HYDROCHLORIDE 0.1 MG: 0.1 TABLET ORAL at 20:26

## 2018-09-06 RX ADMIN — FUROSEMIDE 40 MG: 10 INJECTION, SOLUTION INTRAMUSCULAR; INTRAVENOUS at 07:54

## 2018-09-06 RX ADMIN — ENOXAPARIN SODIUM 40 MG: 40 INJECTION SUBCUTANEOUS at 07:55

## 2018-09-06 RX ADMIN — SODIUM CHLORIDE, PRESERVATIVE FREE 10 ML: 5 INJECTION INTRAVENOUS at 04:42

## 2018-09-06 RX ADMIN — ACETAMINOPHEN 650 MG: 325 TABLET, FILM COATED ORAL at 04:02

## 2018-09-06 RX ADMIN — HYDRALAZINE HYDROCHLORIDE 10 MG: 20 INJECTION INTRAMUSCULAR; INTRAVENOUS at 03:12

## 2018-09-06 ASSESSMENT — PAIN SCALES - GENERAL
PAINLEVEL_OUTOF10: 3
PAINLEVEL_OUTOF10: 0

## 2018-09-06 NOTE — PROGRESS NOTES
hour   Intake             1419 ml   Output             6425 ml   Net            -5006 ml       General Appearance:  Alert, cooperative, no distress, appears older than stated age   Head:  Normocephalic, without obvious abnormality, atraumatic, facial hair   Eyes:  PERRL, conjunctiva/corneas clear       Nose: Nares normal, no drainage or sinus tenderness   Throat: Lips, mucosa, and tongue normal   Neck: Supple, symmetrical, trachea midline, no adenopathy, thyroid: not enlarged, symmetric, no tenderness/mass/nodules, no carotid bruit or JVD       Lungs:   Clear to auscultation bilaterally, respirations unlabored   Chest Wall:  No tenderness or deformity   Heart:  Regular rhythm and normal rate; S1, S2 are normal; no murmur noted; no rub or gallop   Abdomen:   Soft, obese, non-tender, bowel sounds active all four quadrants,  no masses, no organomegaly           Extremities: Extremities normal, atraumatic, no cyanosis. + edema   Pulses: 2+ and symmetric   Skin: Skin color, texture, turgor normal, no rashes or lesions   Pysch: Normal mood and affect, alert and oriented x3    Neurologic: Normal gross motor and sensory exam.         Labs  Recent Labs      09/05/18   0934  09/06/18   0429   WBC  11.8*  11.8*   HGB  9.6*  9.8*   HCT  33.3*  32.6*   MCV  71.3*  71.2*   PLT  244  225     Recent Labs      09/05/18   2337  09/06/18   0429   CREATININE  0.9  0.9   BUN  16  15   NA  132*  136   K  3.6  4.2   CL  97*  100   CO2  23  24     No results for input(s): INR, PROTIME in the last 72 hours. Recent Labs      09/05/18   0934  09/05/18   1609  09/05/18   1939   TROPONINI  <0.01  <0.01  <0.01     Invalid input(s): PRO-BNP  No results for input(s): CHOL, HDL in the last 72 hours. Invalid input(s): LDL, TG          Assessment:  43 y.o. patient with:  1. Malignant hypertension   ~BP: (140-175)/(79-93)     ~severe  2. Chronic diastolic CHF    ~repeat echo to assess LVEF  3. Obesity    ~PCOS   ~Body mass index is 47.66 kg/m².

## 2018-09-06 NOTE — CONSULTS
[address]   Cardiovascular Evaluation    PATIENT: Hillary Blackwood  DATE: 2018  MRN: [id number]  CSN: [id number]  : 1980    Primary Care Doctor: No primary care provider on file. Reason for evaluation:   Shortness of Breath (everyday for months); Cough; and Toe Injury      History of present illness on initial date of evaluation:   Hillary Blackwood is a 43 y.o. patient who presents with a several week history of SOB, LE edema and weakness. She was admitted to the hospital on 2016 for hypertension and volume overload. She was started on multiple medications and was discharged. She had seen me in the office after that but did not complete long term follow-up. She reports that prior to presenting to the emergency room she was feeling increased shortness of breath. She reports her breathing became worse over a weeks time and was eventually gasping for air. She reports she feels better this AM after getting lasix and BP better controlled. She was noted to have severe hypertension with acute pulmonary edema. Overnight, she was seen and started on IV calcium channel blocker. Patient Active Problem List   Diagnosis    Hypertensive urgency    Dyspnea    Edema    Acute on chronic diastolic heart failure (HCC)    Hypertensive emergency    Polycystic ovarian disease    Obesity    Anemia    Acute congestive heart failure ([facility] [address]         Cardiac Testing: I have reviewed the findings below. EKG:  ECHO:   STRESS TEST:  CATH:  BYPASS:  VASCULAR:    Past Medical History:   has a past medical history of Hypertension and Obesity. Surgical History:   has no past surgical history on file. Social History:   reports that she has quit smoking. Her smoking use included Cigarettes. She quit smokeless tobacco use about 6 years ago. She reports that she does not drink alcohol or use drugs.      Family History:  No evidence for sudden cardiac death or premature CAD    Medications:  Reviewed and are listed in nursing record. and/or listed below  Outpatient Medications:  No current facility-administered medications on file prior to encounter. No current outpatient prescriptions on file prior to encounter. Allergies:  Patient has no known allergies. Review of Systems:   Review of Systems:   All 14 point review of symptoms completed. Pertinent positives identified in the HPI, all other review of symptoms negative as below.     Review of Systems - History obtained from the patient  General ROS: negative for - chills, fever or night sweats  Psychological ROS: negative for - disorientation or hallucinations  Ophthalmic ROS: negative for - dry eyes, eye pain or loss of vision  ENT ROS: negative for - nasal discharge or sore throat  Allergy and Immunology ROS: negative for - hives or itchy/watery eyes  Hematological and Lymphatic ROS: negative for - jaundice or night sweats  Endocrine ROS: negative for - mood swings or temperature intolerance  Breast ROS: deferred  Respiratory ROS: negative for - hemoptysis or stridor  Cardiovascular ROS: negative for - chest pain, dyspnea on exertion or palpitations  Gastrointestinal ROS: no abdominal pain, change in bowel habits, or black or bloody stools  Genito-Urinary ROS: no dysuria, trouble voiding, or hematuria  Musculoskeletal ROS: negative for - gait disturbance, joint pain or joint stiffness  Neurological ROS: negative for - seizures or speech problems  Dermatological ROS: negative for - rash or skin lesion changes      Physical Examination:    Vitals:    09/06/18 1000   BP: (!) 175/91   Pulse: 71   Resp:    Temp:    SpO2:     Weight: (!) 304 lb 4.8 oz (138 kg)     Wt Readings from Last 3 Encounters:   09/06/18 (!) 304 lb 4.8 oz (138 kg)   09/02/16 (!) 323 lb (146.5 kg)   07/27/16 (!) 320 lb 14.4 oz (145.6 kg)       Intake/Output Summary (Last 24 hours) at 09/06/18 1139  Last data filed at 09/06/18 1042   Gross per 24 4. Anemia    Plan:  1. BP control with transition to oral meds  2. Echocardiogram  3. Secondary hypertension work-up  4. The patient was seen for >25 minutes. >50% of the time was devoted to giving the patient detailed instructions instructions on addressing diet, regular exercise, weight control, smoking abstention, medication compliance, and stress minimization. The patient was provided written and verbal instructions regarding risk factor modification. 5. CHF education reinforced. ~salt restriction   ~fluid restriction   ~medication compliance   ~daily weights and notify of any significant weight gain/loss   ~establish with CHF nurse   ~outpatient follow-up with our CHF team      All questions and concerns were addressed to the patient/family. Alternatives to my treatment were discussed. The note was completed using EMR. Every effort was made to ensure accuracy; however, inadvertent computerized transcription errors may be present.     Ashly Cheema MD, [address], [place], [place]  [phone] [place] office  [phone] [facility]  9/6/2018  11:39 AM

## 2018-09-06 NOTE — PROGRESS NOTES
Transfer to [facility] from [address] [place] [address] X Cece Valdes and Liz DUDLEY performed complete skin assessment on transfer. Assessment revealed within defined limits. Patient belongings transferred with patient include: clothing, cell phone. Tele monitor assigned to patient, in place for transfer. [facility] notified of transfer. LDAs reviewed and documented. Patient contacted family to inform of transfer.

## 2018-09-06 NOTE — PROGRESS NOTES
[facility]    [facility]              [phone]                       Plan :     BP coming down  Goal at - 107  Diastolic not lower than 60  Long term goal is 120/70 mmHg  Will avoid amlodipine/nifedipine due to CHF  She has massive edema- diuretics itself will help BP   Will not escalate treatment today     Assessment :     Hypertension emergency - malignant hypertension   systolic on admission  BP: (140-175)/(79-93)  Pulse:  [63-78]     UA: 9/5/18- bland  UDS: ordered  Renal USG : 8/18- R- 13.4 cm, L- 12.8 cm  CT Abdomen :NA   Renal Artery duplex: 8/18- no evidence or renal artery stenosis    Renin/Aldosterone:pending  serum metanephrine: pending TSH pending  Obstructive sleep apnea: she is obese, may need to check as outpatient  Steroid use/ hormones/alpha agonists/NSAID's use: Taking Ibuprofen, asked to avoid   nancy when hypertension is uncontrolled, and CHF    Got nitro followed by nicardipine drip  Now off       Generalized Edema  On iv lasix  Sob getting better  Has pulmonary edema  Also on aldactone  Very edematous      Polycystic ovary syndrome  Will order testosterone total, free, prolactin, FSH, LH  High testosterone level could be contributing to hypertension  May need     Had normal renin, aldosterone, DHEA, 17- Hydroxyprogesterone on 8/14  Also normal testosterone       [facility] would like to thank Adrian Serrano MD   for opportunity to serve this patient      Please call with questions at-   24 Hrs Answering service [phone] or  7 am- 5 pm via Perfect serve or cell phone  Dr.Sudhir Leif Barton          CC/reason for consult :     Hypertensive emergency     HPI :   From consult note-     Marely Weathers is a 43 y.o. female presented to   the hospital on 9/5/2018 with shortness of breath,  Edema of legs, and high bp. She is known to have  Hypertension, and polycystic kidney disease,couldn't  Be on medication due to lack of insurance.  Her   BP is

## 2018-09-06 NOTE — PROGRESS NOTES
Hospitalist Progress Note      PCP: No primary care provider on file. Date of Admission: 9/5/2018    Chief Complaint:     Hospital Course:   43 y.o. female  Who previously was on medications for HTN approximately 2 years ago when she lost her insurance and PCP. She has not been on any medication for the last 2 years. She also has a known history of polycystic ovarian disease. She presented to the [facility] emergency department today with shortness of breath and lower extremity swelling she has noticed for the last several months she's been progressively more short of breath to the point now she cannot lay flat night. She denies any fevers chills nausea vomiting or diarrhea she has an increased fatigue no blurred vision and no headaches. No chest pain. ED workup revealed chest x-ray with pulmonary edema BMP was greater than 4000. Initial blood pressures to 264-151 systolically. ECG was nonacute initial troponin was negative. She was given when necessary hydralazine and started on a nitroglycerin patch however after an hour this made little difference. Hospitalist service was asked to admit for further evaluation and treatment. The patient was admitted to the ICU and started on a nicardipine drip because she reported headache with nitroglycerin. Gtt weaned BP improved transferred out to PCU     Subjective: No acute events since admission. Sitting up in chair, Víctor Zuleta has resolved, fells like swelling is decreasing in her legs.  No Chest pain, sob, n/v d       Medications:  Reviewed    Infusion Medications   Scheduled Medications    mupirocin   Nasal BID    sodium chloride flush  10 mL Intravenous 2 times per day    enoxaparin  40 mg Subcutaneous Daily    famotidine (PEPCID) injection  20 mg Intravenous BID    furosemide  40 mg Intravenous BID    spironolactone  25 mg Oral Daily    cloNIDine  0.1 mg Oral TID    capsaicin   Topical BID     PRN Meds: acetaminophen, sodium chloride flush, BILITOT  1.3*   ALKPHOS  74     No results for input(s): INR in the last 72 hours. Recent Labs      09/05/18   0934  09/05/18   1609  09/05/18   1939   TROPONINI  <0.01  <0.01  <0.01       Urinalysis:    Lab Results   Component Value Date    NITRU Negative 09/05/2018    WBCUA 6-10 07/27/2016    BACTERIA 3+ 07/27/2016    RBCUA 3-5 07/27/2016    BLOODU Negative 09/05/2018    SPECGRAV <=1.005 09/05/2018    GLUCOSEU Negative 09/05/2018       Radiology:  VL Renal Arterial Duplex Complete         US RENAL COMPLETE   Final Result   No sonographic abnormality. XR CHEST STANDARD (2 VW)   Final Result   Cardiomegaly and pulmonary vascular congestion. Assessment/Plan:    Active Hospital Problems    Diagnosis Date Noted    Dyspnea [R06.00] 09/05/2018    Edema [R60.9] 09/05/2018    Acute on chronic diastolic heart failure ([facility] [address] [I50.33] 09/05/2018    Hypertensive emergency [I16.1] 09/05/2018    Polycystic ovarian disease [E28.2] 09/05/2018    Obesity [E66.9] 09/05/2018    Anemia [D64.9] 09/05/2018    Acute congestive heart failure ([facility] [address] [I50.9]      POA with SOB and extremity edema in the setting of HTN Emergency with HF/ Pulmonary edema   - -260's uneffected by initial treatment in the ED, Admitted to ICU for Nicardipine gtt this has been weaned off.    - CXR with pulm edema   - BNP 4762   - IV lasix, spironolactone, clonidine TID  - monitor tele, daily weights and I/O   - Some previous work up for UC: She was a strained care secondary to lack of insurance  - Labs: Metanephrines, aldosterone, a.m. cortisol, renin, TSH urine microalbumin and creatinine  - Echocardiogram- previous echocardiogram in 2016 showed preserved ejection fraction with grade 2 diastolic dysfunction  - Renal artery ultrasound  - Low sodium 2 L restriction  - monitor sats- no issues with hypoxia   - Nephrology/ Card consult - appreciate input   - Recommend Op Sleep study suspect DARYL     Acute on diastolic heart failure: as above         PSO- reports regular periods now was previously on metformin   - will defer to OG/Gyn as OP      Hyperglycemia likely metabolic syndrome/ PSO  - check A1C      Morbid Obesity - BMI  Complicating assessment and treatment. Placing patient at risk for multiple co-morbidities as well as early death and contributing to the patient's presentation. Counseled on weight loss. Anemia microcytic   - h/h stable, no current source of bleeding, previous hx of heavy periods   - check iron, ferritin, B12 and folate     Toe nail tauma  - nail lifted and instable, have asked podiatry to evaluate  likely needs digit block for removal      Will need to make arrangments for follow up at [address]   DVT Prophylaxis: Lovenox   Diet: DIET CARDIAC; Low Sodium (2 GM);  Daily Fluid Restriction: 2000 ml  Code Status: Full Code    PT/OT Eval Status: NA    Dispo - Transfer to [address] [place] [address], APRN - CNP

## 2018-09-07 LAB — ALDOSTERONE: 8.7 NG/DL

## 2018-09-07 PROCEDURE — 6360000002 HC RX W HCPCS: Performed by: INTERNAL MEDICINE

## 2018-09-07 PROCEDURE — 2580000003 HC RX 258: Performed by: NURSE PRACTITIONER

## 2018-09-07 PROCEDURE — 2500000003 HC RX 250 WO HCPCS: Performed by: INTERNAL MEDICINE

## 2018-09-07 PROCEDURE — 2500000003 HC RX 250 WO HCPCS: Performed by: NURSE PRACTITIONER

## 2018-09-07 PROCEDURE — 6370000000 HC RX 637 (ALT 250 FOR IP): Performed by: NURSE PRACTITIONER

## 2018-09-07 PROCEDURE — 6370000000 HC RX 637 (ALT 250 FOR IP): Performed by: INTERNAL MEDICINE

## 2018-09-07 PROCEDURE — S0028 INJECTION, FAMOTIDINE, 20 MG: HCPCS | Performed by: NURSE PRACTITIONER

## 2018-09-07 PROCEDURE — 6360000002 HC RX W HCPCS: Performed by: NURSE PRACTITIONER

## 2018-09-07 PROCEDURE — 6370000000 HC RX 637 (ALT 250 FOR IP): Performed by: HOSPITALIST

## 2018-09-07 PROCEDURE — 99233 SBSQ HOSP IP/OBS HIGH 50: CPT | Performed by: INTERNAL MEDICINE

## 2018-09-07 PROCEDURE — 1200000000 HC SEMI PRIVATE

## 2018-09-07 RX ORDER — SPIRONOLACTONE 25 MG/1
50 TABLET ORAL DAILY
Status: DISCONTINUED | OUTPATIENT
Start: 2018-09-08 | End: 2018-09-09

## 2018-09-07 RX ORDER — LABETALOL 200 MG/1
200 TABLET, FILM COATED ORAL EVERY 12 HOURS SCHEDULED
Status: DISCONTINUED | OUTPATIENT
Start: 2018-09-07 | End: 2018-09-09 | Stop reason: HOSPADM

## 2018-09-07 RX ORDER — FERROUS SULFATE 325(65) MG
325 TABLET ORAL
Status: DISCONTINUED | OUTPATIENT
Start: 2018-09-07 | End: 2018-09-09 | Stop reason: HOSPADM

## 2018-09-07 RX ADMIN — LABETALOL HYDROCHLORIDE 200 MG: 200 TABLET, FILM COATED ORAL at 10:42

## 2018-09-07 RX ADMIN — METFORMIN HYDROCHLORIDE 500 MG: 500 TABLET ORAL at 18:06

## 2018-09-07 RX ADMIN — FERROUS SULFATE TAB 325 MG (65 MG ELEMENTAL FE) 325 MG: 325 (65 FE) TAB at 18:05

## 2018-09-07 RX ADMIN — FUROSEMIDE 40 MG: 10 INJECTION, SOLUTION INTRAMUSCULAR; INTRAVENOUS at 22:31

## 2018-09-07 RX ADMIN — FUROSEMIDE 40 MG: 10 INJECTION, SOLUTION INTRAMUSCULAR; INTRAVENOUS at 08:15

## 2018-09-07 RX ADMIN — LABETALOL HYDROCHLORIDE 20 MG: 5 INJECTION, SOLUTION INTRAVENOUS at 03:27

## 2018-09-07 RX ADMIN — LABETALOL HYDROCHLORIDE 200 MG: 200 TABLET, FILM COATED ORAL at 22:31

## 2018-09-07 RX ADMIN — HYDRALAZINE HYDROCHLORIDE 10 MG: 20 INJECTION INTRAMUSCULAR; INTRAVENOUS at 15:51

## 2018-09-07 RX ADMIN — SPIRONOLACTONE 25 MG: 25 TABLET ORAL at 08:15

## 2018-09-07 RX ADMIN — ACETAMINOPHEN 650 MG: 325 TABLET, FILM COATED ORAL at 08:32

## 2018-09-07 RX ADMIN — SODIUM CHLORIDE, PRESERVATIVE FREE 10 ML: 5 INJECTION INTRAVENOUS at 08:28

## 2018-09-07 RX ADMIN — LABETALOL HYDROCHLORIDE 20 MG: 5 INJECTION, SOLUTION INTRAVENOUS at 08:15

## 2018-09-07 RX ADMIN — CLONIDINE HYDROCHLORIDE 0.1 MG: 0.1 TABLET ORAL at 14:21

## 2018-09-07 RX ADMIN — FAMOTIDINE 20 MG: 10 INJECTION, SOLUTION INTRAVENOUS at 22:31

## 2018-09-07 RX ADMIN — CLONIDINE HYDROCHLORIDE 0.1 MG: 0.1 TABLET ORAL at 22:31

## 2018-09-07 RX ADMIN — FAMOTIDINE 20 MG: 10 INJECTION, SOLUTION INTRAVENOUS at 08:15

## 2018-09-07 RX ADMIN — CLONIDINE HYDROCHLORIDE 0.1 MG: 0.1 TABLET ORAL at 08:15

## 2018-09-07 RX ADMIN — SODIUM CHLORIDE, PRESERVATIVE FREE 10 ML: 5 INJECTION INTRAVENOUS at 22:33

## 2018-09-07 RX ADMIN — ENOXAPARIN SODIUM 40 MG: 40 INJECTION SUBCUTANEOUS at 08:15

## 2018-09-07 ASSESSMENT — PAIN SCALES - GENERAL: PAINLEVEL_OUTOF10: 3

## 2018-09-07 NOTE — PROGRESS NOTES
consult :     Hypertensive emergency     HPI :   From consult note-     Harshil Bradley is a 43 y.o. female presented to   the hospital on 9/5/2018 with shortness of breath,  Edema of legs, and high bp. She is known to have  Hypertension, and polycystic kidney disease,couldn't  Be on medication due to lack of insurance. Her   BP is always high. Used to be on lisinopril in the  Past but not now. I saw her after she got iv morphine,and she feels  Little drowsy and is unable to provide great details. We are consulted for hypertension emergency.     Denies drugs  Taking motrin at home  No other herbal supplements  No renal disease  Father had hypertension at young age  Lives with parents, doesn't have sexual partner at    This time, and no contraception being used, and not      Planning to have children in the near future  Has Polycystic ovary syndrome, not followed by any    Physician at this time  Has hirsuitism     Interval History:     BP coming down    ROS:     Seen with- no family  Spoke to RN  And CNP    SOB- none  Edema- none  Nausea/vomiting- none  Poor appetite-No  Confusion- no  Urinary complaints- no  Any other complaints- no  All other ROS are reviewed and are Negative     Medication:     Scheduled Meds:   [START ON 9/8/2018] spironolactone  50 mg Oral Daily    labetalol  200 mg Oral 2 times per day    mupirocin   Nasal BID    sodium chloride flush  10 mL Intravenous 2 times per day    enoxaparin  40 mg Subcutaneous Daily    famotidine (PEPCID) injection  20 mg Intravenous BID    furosemide  40 mg Intravenous BID    cloNIDine  0.1 mg Oral TID    capsaicin   Topical BID     Continuous Infusions:    PRN Meds:.acetaminophen, sodium chloride flush, ondansetron, magnesium hydroxide, hydrALAZINE, calcium carbonate       Vitals :     Vitals:    09/07/18 1143   BP: (!) 158/92   Pulse: 66   Resp: 16   Temp: 98 °F (36.7 °C)   SpO2: 94%       I & O :       Intake/Output Summary (Last 24 hours) at 09/07/18

## 2018-09-07 NOTE — PROGRESS NOTES
Nutrition Education    Type and Reason for Visit: Initial, Consult, Patient Education    Consult received for CHF diet education. Pt is a 44 yo female with a Hx of HTN, who was admitted with SOB and LE swelling. Provided pt with written and verbal instruction on HF nutrition therapy. Discussed low sodium diet, daily weights, and fluid restriction. Pt voiced understanding. Pt reports that she does not eat a lot of salt and does not salt her foods. Pt does report of eating some salty foods such as lunch meat and canned soups. Encouraged diet compliance. Time spent: 10 minutes    · Verbally reviewed following information with Patient: HF nutrition therapy  · Written educational materials provided. · Contact name and number provided. · Refer to Patient Education activity for more details.     Electronically signed by Odell Brennan RD, LD on 9/7/18 at 10:28 AM    Contact Number: [id number]

## 2018-09-07 NOTE — PROGRESS NOTES
[address] Daily Progress Note      Admit Date:  9/5/2018    Subjective:  Ms. Jourdan Luke is seen for shortness of breath and uncontrolled hypertension  She had no insurance ran out of meds could not afford medical care and has not been taking any meds before this admission  History of present illness on initial date of evaluation by my associate Dr Kendall Livingston on 9.6.18: Rosanne Gudino is a 43 y.o. patient who presents with a several week history of SOB, LE edema and weakness. She was admitted to the hospital on 7/27/2016 for hypertension and volume overload. She was started on multiple medications and was discharged. She had seen me in the office after that but did not complete long term follow-up. She reports that prior to presenting to the emergency room she was feeling increased shortness of breath. She reports her breathing became worse over a weeks time and was eventually gasping for air. She reports she feels better this AM after getting lasix and BP better controlled. She was noted to have severe hypertension with acute pulmonary edema. Overnight, she was seen and started on IV calcium channel blocker.      ROS:  12 point ROS negative in all areas as listed below except as in [place]  Constitutional, EENT, Cardiovascular, pulmonary, GI, , Musculoskeletal, skin, neurological, hematological, endocrine, Psychiatric    Past Medical History:   Diagnosis Date    Hypertension     Obesity      History reviewed. No pertinent surgical history.     Objective:   BP (!) 193/91   Pulse 72   Temp 98 °F (36.7 °C) (Oral)   Resp 16   Ht 5' 7\" (1.702 m)   Wt 296 lb 12.8 oz (134.6 kg)   LMP 08/27/2018 (Approximate) Comment: 2 weeks ago   SpO2 94%   BMI 46.49 kg/m²     Intake/Output Summary (Last 24 hours) at 09/07/18 0833  Last data filed at 09/07/18 0505   Gross per 24 hour   Intake             1200 ml   Output             4800 ml   Net            -3600 ml

## 2018-09-07 NOTE — PROGRESS NOTES
Hospitalist Progress Note      PCP: No primary care provider on file. Date of Admission: 9/5/2018    Chief Complaint: Shortness of breath and leg swelling     Hospital Course:   43 y.o. female  Who previously was on medications for HTN approximately 2 years ago when she lost her insurance and PCP. She has not been on any medication for the last 2 years. She also has a known history of polycystic ovarian disease. She presented to the [facility] emergency department today with shortness of breath and lower extremity swelling she has noticed for the last several months she's been progressively more short of breath to the point now she cannot lay flat night. She denies any fevers chills nausea vomiting or diarrhea she has an increased fatigue no blurred vision and no headaches. No chest pain. ED workup revealed chest x-ray with pulmonary edema BMP was greater than 4000. Initial blood pressures to 809-838 systolically. ECG was nonacute initial troponin was negative. She was given when necessary hydralazine and started on a nitroglycerin patch however after an hour this made little difference. Hospitalist service was asked to admit for further evaluation and treatment. The patient was admitted to the ICU and started on a nicardipine drip because she reported headache with nitroglycerin.  Gtt weaned BP improved transferred out to PCU     Subjective:   No complaint, breathing much easier, less fatigued     Medications:  Reviewed    Infusion Medications   Scheduled Medications    mupirocin   Nasal BID    sodium chloride flush  10 mL Intravenous 2 times per day    enoxaparin  40 mg Subcutaneous Daily    famotidine (PEPCID) injection  20 mg Intravenous BID    furosemide  40 mg Intravenous BID    spironolactone  25 mg Oral Daily    cloNIDine  0.1 mg Oral TID    capsaicin   Topical BID     PRN Meds: acetaminophen, sodium chloride flush, ondansetron, magnesium hydroxide, labetalol, hydrALAZINE, calcium carbonate      Intake/Output Summary (Last 24 hours) at 09/07/18 0643  Last data filed at 09/07/18 0505   Gross per 24 hour   Intake             1200 ml   Output             4800 ml   Net            -3600 ml       Physical Exam Performed:    BP (!) 179/105   Pulse 68   Temp 97.8 °F (36.6 °C) (Axillary)   Resp 18   Ht 5' 7\" (1.702 m)   Wt 296 lb 12.8 oz (134.6 kg)   LMP 08/27/2018 (Approximate) Comment: 2 weeks ago   SpO2 95%   BMI 46.49 kg/m²     General appearance: Young morbidly obese female sitting on side of bed in no acute distress  HEENT:  Normal cephalic, atraumatic without obvious deformity. Pupils equal, round, and reactive to light. Extra ocular muscles intact. Conjunctivae/corneas clear. Neck: Supple, with full range of motion. No jugular venous distention. Trachea midline. Respiratory:  GAYLE and at rest with bilateral crackles  Cardiovascular:  Regular rate and rhythm with normal S1/S2 without murmurs, rubs or gallops. Abdomen: Protruding, taunt, non-tender, distended with normal bowel sounds. Musculoskeletal:  No clubbing, cyanosis or edema bilaterally. Full range of motion without deformity. Skin:   Neurologic:  Neurovascularly intact without any focal sensory/motor deficits.  Cranial nerves: II-XII intact, grossly non-focal.  Psychiatric:  Alert and oriented, thought content appropriate, normal insight  Capillary Refill: Brisk,< 3 seconds   Peripheral Pulses: +2 palpable, equal bilaterally     Labs:   Recent Labs      09/05/18   0934  09/06/18   0429   WBC  11.8*  11.8*   HGB  9.6*  9.8*   HCT  33.3*  32.6*   PLT  244  225     Recent Labs      09/05/18   0934  09/05/18   2337  09/06/18   0429   NA  138  132*  136   K  4.2  3.6  4.2   CL  103  97*  100   CO2  21  23  24   BUN  19  16  15   CREATININE  1.0  0.9  0.9   CALCIUM  9.1  8.7  9.2   PHOS   --   3.5   --      Recent Labs      09/05/18   0934   AST  14*   ALT  8*   BILITOT  1.3*   ALKPHOS  74     No results for input(s):

## 2018-09-08 LAB
ANION GAP SERPL CALCULATED.3IONS-SCNC: 11 MMOL/L (ref 3–16)
BUN BLDV-MCNC: 16 MG/DL (ref 7–20)
CALCIUM SERPL-MCNC: 9.2 MG/DL (ref 8.3–10.6)
CHLORIDE BLD-SCNC: 101 MMOL/L (ref 99–110)
CO2: 27 MMOL/L (ref 21–32)
CREAT SERPL-MCNC: 1.2 MG/DL (ref 0.6–1.1)
GFR AFRICAN AMERICAN: >60
GFR NON-AFRICAN AMERICAN: 50
GLUCOSE BLD-MCNC: 154 MG/DL (ref 70–99)
MAGNESIUM: 2.3 MG/DL (ref 1.8–2.4)
POTASSIUM REFLEX MAGNESIUM: 3.5 MMOL/L (ref 3.5–5.1)
RENIN ACTIVITY: 0.9 NG/ML/HR
SEX HORMONE BINDING GLOBULIN: 49 NMOL/L (ref 30–135)
SODIUM BLD-SCNC: 139 MMOL/L (ref 136–145)
TESTOSTERONE FREE-NONMALE: 1.4 PG/ML (ref 1.3–9.2)
TESTOSTERONE TOTAL: 10 NG/DL (ref 20–70)

## 2018-09-08 PROCEDURE — 6360000002 HC RX W HCPCS: Performed by: NURSE PRACTITIONER

## 2018-09-08 PROCEDURE — S0028 INJECTION, FAMOTIDINE, 20 MG: HCPCS | Performed by: NURSE PRACTITIONER

## 2018-09-08 PROCEDURE — 6370000000 HC RX 637 (ALT 250 FOR IP): Performed by: NURSE PRACTITIONER

## 2018-09-08 PROCEDURE — 2500000003 HC RX 250 WO HCPCS: Performed by: NURSE PRACTITIONER

## 2018-09-08 PROCEDURE — 6370000000 HC RX 637 (ALT 250 FOR IP): Performed by: INTERNAL MEDICINE

## 2018-09-08 PROCEDURE — 99233 SBSQ HOSP IP/OBS HIGH 50: CPT | Performed by: NURSE PRACTITIONER

## 2018-09-08 PROCEDURE — 2580000003 HC RX 258: Performed by: NURSE PRACTITIONER

## 2018-09-08 PROCEDURE — 1200000000 HC SEMI PRIVATE

## 2018-09-08 PROCEDURE — 36415 COLL VENOUS BLD VENIPUNCTURE: CPT

## 2018-09-08 PROCEDURE — 83735 ASSAY OF MAGNESIUM: CPT

## 2018-09-08 PROCEDURE — 6370000000 HC RX 637 (ALT 250 FOR IP): Performed by: HOSPITALIST

## 2018-09-08 PROCEDURE — 6360000002 HC RX W HCPCS: Performed by: INTERNAL MEDICINE

## 2018-09-08 PROCEDURE — 80048 BASIC METABOLIC PNL TOTAL CA: CPT

## 2018-09-08 RX ORDER — FUROSEMIDE 10 MG/ML
40 INJECTION INTRAMUSCULAR; INTRAVENOUS DAILY
Status: DISCONTINUED | OUTPATIENT
Start: 2018-09-09 | End: 2018-09-09

## 2018-09-08 RX ADMIN — LABETALOL HYDROCHLORIDE 200 MG: 200 TABLET, FILM COATED ORAL at 08:22

## 2018-09-08 RX ADMIN — SODIUM CHLORIDE, PRESERVATIVE FREE 10 ML: 5 INJECTION INTRAVENOUS at 08:22

## 2018-09-08 RX ADMIN — SPIRONOLACTONE 50 MG: 25 TABLET ORAL at 08:22

## 2018-09-08 RX ADMIN — CLONIDINE HYDROCHLORIDE 0.1 MG: 0.1 TABLET ORAL at 14:31

## 2018-09-08 RX ADMIN — FERROUS SULFATE TAB 325 MG (65 MG ELEMENTAL FE) 325 MG: 325 (65 FE) TAB at 12:54

## 2018-09-08 RX ADMIN — FERROUS SULFATE TAB 325 MG (65 MG ELEMENTAL FE) 325 MG: 325 (65 FE) TAB at 08:22

## 2018-09-08 RX ADMIN — FUROSEMIDE 40 MG: 10 INJECTION, SOLUTION INTRAMUSCULAR; INTRAVENOUS at 08:22

## 2018-09-08 RX ADMIN — ENOXAPARIN SODIUM 40 MG: 40 INJECTION SUBCUTANEOUS at 08:21

## 2018-09-08 RX ADMIN — FAMOTIDINE 20 MG: 10 INJECTION, SOLUTION INTRAVENOUS at 08:22

## 2018-09-08 RX ADMIN — CLONIDINE HYDROCHLORIDE 0.1 MG: 0.1 TABLET ORAL at 22:46

## 2018-09-08 RX ADMIN — FAMOTIDINE 20 MG: 10 INJECTION, SOLUTION INTRAVENOUS at 22:46

## 2018-09-08 RX ADMIN — ACETAMINOPHEN 650 MG: 325 TABLET, FILM COATED ORAL at 08:34

## 2018-09-08 RX ADMIN — CLONIDINE HYDROCHLORIDE 0.1 MG: 0.1 TABLET ORAL at 08:22

## 2018-09-08 RX ADMIN — LABETALOL HYDROCHLORIDE 200 MG: 200 TABLET, FILM COATED ORAL at 22:46

## 2018-09-08 RX ADMIN — METFORMIN HYDROCHLORIDE 500 MG: 500 TABLET ORAL at 08:22

## 2018-09-08 RX ADMIN — FERROUS SULFATE TAB 325 MG (65 MG ELEMENTAL FE) 325 MG: 325 (65 FE) TAB at 17:03

## 2018-09-08 RX ADMIN — SODIUM CHLORIDE, PRESERVATIVE FREE 10 ML: 5 INJECTION INTRAVENOUS at 22:47

## 2018-09-08 RX ADMIN — METFORMIN HYDROCHLORIDE 500 MG: 500 TABLET ORAL at 17:03

## 2018-09-08 RX ADMIN — HYDRALAZINE HYDROCHLORIDE 10 MG: 20 INJECTION INTRAMUSCULAR; INTRAVENOUS at 08:00

## 2018-09-08 ASSESSMENT — PAIN DESCRIPTION - PAIN TYPE: TYPE: ACUTE PAIN

## 2018-09-08 ASSESSMENT — PAIN DESCRIPTION - PROGRESSION
CLINICAL_PROGRESSION: NOT CHANGED

## 2018-09-08 ASSESSMENT — PAIN SCALES - GENERAL
PAINLEVEL_OUTOF10: 0
PAINLEVEL_OUTOF10: 0
PAINLEVEL_OUTOF10: 3
PAINLEVEL_OUTOF10: 3
PAINLEVEL_OUTOF10: 0

## 2018-09-08 ASSESSMENT — PAIN DESCRIPTION - LOCATION: LOCATION: HEAD

## 2018-09-08 NOTE — PROGRESS NOTES
Patient's EF (Ejection Fraction) is greater than 40%    Patient has a past medical history of Hypertension and Obesity. Comorbidities reviewed and education provided. Patient and family's stated goal of care: reduce shortness of breath prior to discharge    Patient's current functional capacity:  Slight limitation of physical activity. Comfortable at rest. Ordinary physical activity results in fatigue, palpitation, dyspnea. Pt resting in bed at this time on room air. Pt denies shortness of breath. Pt with pitting lower extremity edema. Patient's weights and intake/output reviewed:    Patient Vitals for the past 96 hrs (Last 3 readings):   Weight   09/07/18 0641 296 lb 12.8 oz (134.6 kg)   09/06/18 0635 (!) 304 lb 4.8 oz (138 kg)   09/05/18 1526 (!) 320 lb 5.3 oz (145.3 kg)       Intake/Output Summary (Last 24 hours) at 09/07/18 2229  Last data filed at 09/07/18 2224   Gross per 24 hour   Intake             1870 ml   Output             3650 ml   Net            -1780 ml       Patient provided with education on CHF signs/symptoms, causes, discharge medications, daily weights, low sodium diet, activity, and follow-up. Notified patient to call the doctor post discharge if patient experiences shortness of breath, chest pain, swelling, cough, or weight gain of three pounds in a day/five pounds in a week. Also notified patient to call the doctor with dizziness, increased fatigue, decreased or difficulty urinating. Pt verbalized understanding. No additional questions at this time.     Education Time: 5 Minutes

## 2018-09-08 NOTE — PROGRESS NOTES
[facility]   Daily Progress Note    Admit Date:  9/5/2018  HPI:    Chief Complaint   Patient presents with    Shortness of Breath     everyday for months    Cough    Toe Injury        Interval history: Frida Adam is being followed for shortness of breath and HTN. Admitted with severe HTN and acute pulmonary edema. Subjective:  Ms. Anoop Thornton breathing is improving. No chest pain. No headaches. Overall, blood pressures are improving.      Objective:   BP (!) 147/87   Pulse 64   Temp 97.7 °F (36.5 °C) (Oral)   Resp 18   Ht 5' 7\" (1.702 m)   Wt 291 lb 12.8 oz (132.4 kg)   LMP 08/27/2018 (Approximate) Comment: 2 weeks ago   SpO2 91%   BMI 45.70 kg/m²     Intake/Output Summary (Last 24 hours) at 09/08/18 1251  Last data filed at 09/08/18 1029   Gross per 24 hour   Intake             1580 ml   Output             6250 ml   Net            -4670 ml       NYHA: III    Physical Exam:  General:  Awake, alert, NAD  Skin:  Warm and dry  Neck:  JVD difficult due to body habitus  Chest:  Clear to auscultation, no wheezes/rhonchi/rales  Cardiovascular:  RRR S1S2, no m/r/g; tele SR rate 65's  Abdomen:  Soft, nontender, +bowel sounds  Extremities:  ++ BLE edema- improving    Medications:    spironolactone  50 mg Oral Daily    labetalol  200 mg Oral 2 times per day    metFORMIN  500 mg Oral BID WC    ferrous sulfate  325 mg Oral TID WC    sodium chloride flush  10 mL Intravenous 2 times per day    enoxaparin  40 mg Subcutaneous Daily    famotidine (PEPCID) injection  20 mg Intravenous BID    furosemide  40 mg Intravenous BID    cloNIDine  0.1 mg Oral TID    capsaicin   Topical BID         Lab Data:  CBC:   Recent Labs      09/06/18   0429   WBC  11.8*   HGB  9.8*   PLT  225     BMP:  Recent Labs      09/05/18   2337  09/06/18   0429  09/08/18   1022   NA  132*  136  139   K  3.6  4.2  3.5   CO2  23  24  27   BUN  16  15  16   CREATININE  0.9  0.9  1.2*     INR:  No results for input(s): INR 304lbs-->296lbs>291lbs   - net negative 11L since admission with 5.9L UOP last 24 hours   - decrease lasix to 40mg IV daily given increase in creatinine and to allow for fluid to mobilize   - repeat BMP in the morning   - continue daily weights, strict I/O's   - low salt diet    ~Obesity  ~Anemia    Shani Fonseca CNP, 9/8/2018, 12:51 PM

## 2018-09-08 NOTE — PROGRESS NOTES
results for input(s): AST, ALT, BILIDIR, BILITOT, ALKPHOS in the last 72 hours. No results for input(s): INR in the last 72 hours. Recent Labs      09/05/18   1939   TROPONINI  <0.01       Urinalysis:      Lab Results   Component Value Date    NITRU Negative 09/05/2018    WBCUA 6-10 07/27/2016    BACTERIA 3+ 07/27/2016    RBCUA 3-5 07/27/2016    BLOODU Negative 09/05/2018    SPECGRAV <=1.005 09/05/2018    GLUCOSEU Negative 09/05/2018       Radiology:  VL Renal Arterial Duplex Complete   Final Result      US RENAL COMPLETE   Final Result   No sonographic abnormality. XR CHEST STANDARD (2 VW)   Final Result   Cardiomegaly and pulmonary vascular congestion.            ECHO  Conclusions      Summary   Technically difficult examination secondary to habitus.   Left ventricular systolic function is normal with ejection fraction   estimated at 55%.   No regional wall motion abnormalities.   Left ventricular cavity size is mildly dilated.   There is moderate concentric left ventricular hypertrophy.   Grade II diastolic dysfunction with elevated left ventricular filling   pressure.   Moderate bi-atrial enlargement.   The right ventricle is mildly enlarged.   Mild tricuspid regurgitation.   Systolic pulmonary artery pressure (SPAP) is normal estimated at 36 mmHg   (Right atrial pressure of 8 mmHg).   Compared to exam done 7/28/2017, left ventricle, left atrium, right   ventricle, and right atrium sizes have all increased.         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 [J81.0]     Dyspnea [R06.00] 09/05/2018    Edema [R60.9] 09/05/2018    Acute on chronic diastolic heart failure ([facility] [I50.33] 09/05/2018    Hypertensive emergency [I16.1] 09/05/2018    Polycystic ovarian disease [E28.2] 09/05/2018    Obesity [E66.9] 09/05/2018    Anemia [D64.9] 09/05/2018    Acute congestive heart failure ([facility] [I50.9]      POA with SOB and extremity edema in the setting of HTN Emergency with HF/ Pulmonary edema   - -260's uneffected by initial treatment in the ED, Admitted to ICU for Nicardipine gtt this has been weaned off. - CXR with pulm edema   - BNP 4762 on admission   - IV lasix, spironolactone, clonidine TID BB added per cards   - monitor tele, daily weights ( 320>304>296>291 )  and I/O (11L)   - Some previous work up for UC: She was a strained care secondary to lack of insurance  - Labs: Metanephrines, aldosterone, a.m. cortisol, renin, TSH urine microalbumin and creatinine  - Echocardiogram- previous echocardiogram in 2016 showed preserved ejection fraction with grade 2 diastolic dysfunction  - Renal artery ultrasound w/o LANDON  - Low sodium 2 L restriction  - monitor sats- no issues with hypoxia   - Nephrology/ Card consult - appreciate input   - Recommend Op Sleep study suspect DARYL  - improving continue current medications      Acute on diastolic heart failure: as above   - diuresing well, cont IV lasix, decrease dose as bump in creatinine     PSO- reports regular periods now was previously on metformin   - OG/Gyn as OP\  - restarted metformin       Hyperglycemia likely metabolic syndrome/ PSO  - check A1C 5.9     Morbid Obesity - BMI  Complicating assessment and treatment. Placing patient at risk for multiple co-morbidities as well as early death and contributing to the patient's presentation. Counseled on weight loss. Anemia microcytic- Fe deff   - h/h stable, no current source of bleeding, previous hx of heavy periods   - check iron, ferritin, B12 and folate  - Ferrous sulfate TID     Toe nail tauma  - nail lifted and instable, have asked podiatry to evaluate  likely needs digit block for removal      Will need to make arrangments for follow up at [address]   DVT Prophylaxis: Lovenox   Diet: DIET CARDIAC; Low Sodium (2 GM);  Daily Fluid Restriction: 2000 ml  Code Status: Full

## 2018-09-09 VITALS
WEIGHT: 290.3 LBS | TEMPERATURE: 97.6 F | RESPIRATION RATE: 18 BRPM | HEIGHT: 67 IN | OXYGEN SATURATION: 94 % | HEART RATE: 59 BPM | DIASTOLIC BLOOD PRESSURE: 83 MMHG | BODY MASS INDEX: 45.56 KG/M2 | SYSTOLIC BLOOD PRESSURE: 138 MMHG

## 2018-09-09 LAB
ANION GAP SERPL CALCULATED.3IONS-SCNC: 11 MMOL/L (ref 3–16)
BUN BLDV-MCNC: 16 MG/DL (ref 7–20)
CALCIUM SERPL-MCNC: 9.4 MG/DL (ref 8.3–10.6)
CHLORIDE BLD-SCNC: 101 MMOL/L (ref 99–110)
CO2: 27 MMOL/L (ref 21–32)
CREAT SERPL-MCNC: 1 MG/DL (ref 0.6–1.1)
GFR AFRICAN AMERICAN: >60
GFR NON-AFRICAN AMERICAN: >60
GLUCOSE BLD-MCNC: 94 MG/DL (ref 70–99)
METANEPH/PLASMA INTERP: ABNORMAL
METANEPHRINE FREE PLASMA: 0.16 NMOL/L (ref 0–0.49)
NORMETANEPHRINE FREE PLASMA: 2.14 NMOL/L (ref 0–0.89)
POTASSIUM REFLEX MAGNESIUM: 3.7 MMOL/L (ref 3.5–5.1)
SODIUM BLD-SCNC: 139 MMOL/L (ref 136–145)

## 2018-09-09 PROCEDURE — 80048 BASIC METABOLIC PNL TOTAL CA: CPT

## 2018-09-09 PROCEDURE — 99232 SBSQ HOSP IP/OBS MODERATE 35: CPT | Performed by: NURSE PRACTITIONER

## 2018-09-09 PROCEDURE — 36415 COLL VENOUS BLD VENIPUNCTURE: CPT

## 2018-09-09 PROCEDURE — 2580000003 HC RX 258: Performed by: NURSE PRACTITIONER

## 2018-09-09 PROCEDURE — 2500000003 HC RX 250 WO HCPCS: Performed by: NURSE PRACTITIONER

## 2018-09-09 PROCEDURE — 6370000000 HC RX 637 (ALT 250 FOR IP): Performed by: INTERNAL MEDICINE

## 2018-09-09 PROCEDURE — 6360000002 HC RX W HCPCS: Performed by: NURSE PRACTITIONER

## 2018-09-09 PROCEDURE — S0028 INJECTION, FAMOTIDINE, 20 MG: HCPCS | Performed by: NURSE PRACTITIONER

## 2018-09-09 PROCEDURE — 6370000000 HC RX 637 (ALT 250 FOR IP): Performed by: NURSE PRACTITIONER

## 2018-09-09 RX ORDER — CLONIDINE HYDROCHLORIDE 0.1 MG/1
0.1 TABLET ORAL 3 TIMES DAILY
Qty: 60 TABLET | Refills: 3 | Status: SHIPPED | OUTPATIENT
Start: 2018-09-09 | End: 2018-10-23 | Stop reason: SDUPTHER

## 2018-09-09 RX ORDER — SPIRONOLACTONE 25 MG/1
50 TABLET ORAL 2 TIMES DAILY
Status: DISCONTINUED | OUTPATIENT
Start: 2018-09-09 | End: 2018-09-09 | Stop reason: HOSPADM

## 2018-09-09 RX ORDER — LABETALOL 200 MG/1
200 TABLET, FILM COATED ORAL EVERY 12 HOURS SCHEDULED
Qty: 60 TABLET | Refills: 3 | Status: SHIPPED | OUTPATIENT
Start: 2018-09-09 | End: 2018-10-23 | Stop reason: SDUPTHER

## 2018-09-09 RX ORDER — SPIRONOLACTONE 50 MG/1
50 TABLET, FILM COATED ORAL 2 TIMES DAILY
Qty: 30 TABLET | Refills: 3 | Status: SHIPPED | OUTPATIENT
Start: 2018-09-09 | End: 2018-09-21 | Stop reason: SDUPTHER

## 2018-09-09 RX ORDER — FUROSEMIDE 40 MG/1
40 TABLET ORAL DAILY
Status: DISCONTINUED | OUTPATIENT
Start: 2018-09-09 | End: 2018-09-09 | Stop reason: HOSPADM

## 2018-09-09 RX ORDER — LIDOCAINE HYDROCHLORIDE 20 MG/ML
5 INJECTION, SOLUTION EPIDURAL; INFILTRATION; INTRACAUDAL; PERINEURAL ONCE
Status: DISCONTINUED | OUTPATIENT
Start: 2018-09-09 | End: 2018-09-09 | Stop reason: SDUPTHER

## 2018-09-09 RX ORDER — FERROUS SULFATE 325(65) MG
325 TABLET ORAL
Qty: 30 TABLET | Refills: 3 | Status: SHIPPED | OUTPATIENT
Start: 2018-09-09

## 2018-09-09 RX ORDER — FUROSEMIDE 40 MG/1
40 TABLET ORAL DAILY
Qty: 60 TABLET | Refills: 3 | Status: SHIPPED | OUTPATIENT
Start: 2018-09-10 | End: 2020-02-25 | Stop reason: SDUPTHER

## 2018-09-09 RX ORDER — LIDOCAINE HYDROCHLORIDE 20 MG/ML
5 INJECTION, SOLUTION EPIDURAL; INFILTRATION; INTRACAUDAL; PERINEURAL ONCE
Status: DISCONTINUED | OUTPATIENT
Start: 2018-09-09 | End: 2018-09-09 | Stop reason: HOSPADM

## 2018-09-09 RX ADMIN — SODIUM CHLORIDE, PRESERVATIVE FREE 10 ML: 5 INJECTION INTRAVENOUS at 08:41

## 2018-09-09 RX ADMIN — FERROUS SULFATE TAB 325 MG (65 MG ELEMENTAL FE) 325 MG: 325 (65 FE) TAB at 08:40

## 2018-09-09 RX ADMIN — METFORMIN HYDROCHLORIDE 500 MG: 500 TABLET ORAL at 08:40

## 2018-09-09 RX ADMIN — ENOXAPARIN SODIUM 40 MG: 40 INJECTION SUBCUTANEOUS at 08:40

## 2018-09-09 RX ADMIN — LABETALOL HYDROCHLORIDE 200 MG: 200 TABLET, FILM COATED ORAL at 08:40

## 2018-09-09 RX ADMIN — FAMOTIDINE 20 MG: 10 INJECTION, SOLUTION INTRAVENOUS at 08:40

## 2018-09-09 RX ADMIN — FUROSEMIDE 40 MG: 10 INJECTION, SOLUTION INTRAMUSCULAR; INTRAVENOUS at 08:40

## 2018-09-09 RX ADMIN — CLONIDINE HYDROCHLORIDE 0.1 MG: 0.1 TABLET ORAL at 08:40

## 2018-09-09 RX ADMIN — SPIRONOLACTONE 50 MG: 25 TABLET ORAL at 08:40

## 2018-09-09 ASSESSMENT — PAIN SCALES - GENERAL: PAINLEVEL_OUTOF10: 0

## 2018-09-09 NOTE — PROGRESS NOTES
Frida Adam is a 43 y.o. female presented to   the hospital on 9/5/2018 with shortness of breath,  Edema of legs, and high bp. She is known to have  Hypertension, and polycystic kidney disease,couldn't  Be on medication due to lack of insurance. Her   BP is always high. Used to be on lisinopril in the  Past but not now. I saw her after she got iv morphine,and she feels  Little drowsy and is unable to provide great details. We are consulted for hypertension emergency.     Denies drugs  Taking motrin at home  No other herbal supplements  No renal disease  Father had hypertension at young age  Lives with parents, doesn't have sexual partner at    This time, and no contraception being used, and not      Planning to have children in the near future  Has Polycystic ovary syndrome, not followed by any    Physician at this time  Has hirsuitism     Interval History:     BP is still elevated    ROS:     Seen with- no family  SOB- none  Edema- none  Nausea/vomiting- none  Poor appetite-No  Confusion- no  Urinary complaints- no  Any other complaints- no  All other ROS are reviewed and are Negative     Medication:     Scheduled Meds:   furosemide  40 mg Oral Daily    lidocaine PF  5 mL Intradermal Once    spironolactone  50 mg Oral Daily    labetalol  200 mg Oral 2 times per day    metFORMIN  500 mg Oral BID WC    ferrous sulfate  325 mg Oral TID WC    sodium chloride flush  10 mL Intravenous 2 times per day    enoxaparin  40 mg Subcutaneous Daily    famotidine (PEPCID) injection  20 mg Intravenous BID    cloNIDine  0.1 mg Oral TID    capsaicin   Topical BID     Continuous Infusions:    PRN Meds:.acetaminophen, sodium chloride flush, ondansetron, magnesium hydroxide, hydrALAZINE, calcium carbonate       Vitals :     Vitals:    09/09/18 0749   BP: (!) 166/92   Pulse: 65   Resp: 18   Temp: 98 °F (36.7 °C)   SpO2: 97%       I & O :       Intake/Output Summary (Last 24 hours) at 09/09/18 1114  Last data filed at 09/09/18 0653   Gross per 24 hour   Intake             1200 ml   Output             1750 ml   Net             -550 ml        Physical Examination :     General appearance: Anxious- yes, distressed- no, in good spirits- no    Lethargic, somnolent, morbidly obese, edematous, and has facial hair  Growth which is significant  HEENT: Lips- normal, teeth- ok , oral mucosa- moist  Neck : Mass- no, appears symmetrical, JVD- not visible  Respiratory: Respiratory effort-  Labored on RA, wheeze- no, crackles - YES  Cardiovascular: Ausculation- No M/R/G, Edema 4+  Abdomen: visible mass- no, distention- no, scar- no, tenderness- no                            hepatosplenomegaly-  no  Musculoskeletal:  clubbing no,cyanosis- no , digital ischemia- no                           muscle strength- grossly normal , tone - grossly normal  Skin: rashes- no , ulcers- no, induration- no, tightening - no-- hair growth  Psychiatric:  Judgement and insight- normal           AAO X 3     LABS:     No results for input(s): WBC, HGB, HCT, PLT in the last 72 hours.   Recent Labs      09/08/18   1022  09/09/18   0737   NA  139  139   K  3.5  3.7   CL  101  101   CO2  27  27   BUN  16  16   CREATININE  1.2*  1.0   GLUCOSE  154*  94   MG  2.30   --

## 2018-09-09 NOTE — DISCHARGE SUMMARY
Tyrone Montelongo, APRN - CNP   9/9/2018      Thank you No primary care provider on file. for the opportunity to be involved in this patient's care. If you have any questions or concerns please feel free to contact me at [phone].

## 2018-09-09 NOTE — PLAN OF CARE
Problem: OXYGENATION/RESPIRATORY FUNCTION  Goal: Patient will achieve/maintain normal respiratory rate/effort  Respiratory rate and effort will be within normal limits for the patient     Intervention: ASSESS OXYGENATION AS ORDERED  Pt is breathing at ease with no c/o SOB at this time. Oxygen saturation is 95% on RA.

## 2018-09-09 NOTE — CONSULTS
 Number of children: N/A    Years of education: N/A     Occupational History    Not on file. Social History Main Topics    Smoking status: Former Smoker     Types: Cigarettes    Smokeless tobacco: Former User     Quit date: 9/5/2012      Comment: e-cig x 5 years    Alcohol use No    Drug use: No    Sexual activity: No     Other Topics Concern    Not on file     Social History Narrative    No narrative on file       Family History:   Breast Cancer-related family history is not on file. Review of Systems:    CONSTITUTIONAL:  negative  HEENT:  negative  RESPIRATORY:  negative  CARDIOVASCULAR:  negative  GASTROINTESTINAL:  negative  GENITOURINARY:  negative  MUSCULOSKELETAL:  negative except for HPI    Physical Exam:    Vitals:    BP (!) 166/92   Pulse 65   Temp 98 °F (36.7 °C) (Oral)   Resp 18   Ht 5' 7\" (1.702 m)   Wt 290 lb 4.8 oz (131.7 kg)   LMP 08/27/2018 (Approximate) Comment: 2 weeks ago   SpO2 97%   BMI 45.47 kg/m²   Integument:  Skin is warm, dry and supple, bilateral.  No openings in the skin. No erythema. No acute signs of infection  Nails:  Right hallux nail is very loose and only attached at the base of the nail. There is pain with movement of the nail. Neurologic:  Protective sensation is grossly intact to light touch at the level of the toes, bilateral.  Vascular:  DP and PT pulses are palpable, bilateral.  CFT is brisk to all toes. No significant edema appreciated. Musculoskeletal:  Patient has 5/5 strength on inversion/everison/dorsiflexion/plantarflexion, bilateral.  No gross musculoskeletal deformities noted.     Labs:  CBC with Differential:    Lab Results   Component Value Date    WBC 11.8 09/06/2018    RBC 4.57 09/06/2018    HGB 9.8 09/06/2018    HCT 32.6 09/06/2018     09/06/2018    MCV 71.2 09/06/2018    MCH 21.4 09/06/2018    MCHC 30.1 09/06/2018    RDW 22.1 09/06/2018    LYMPHOPCT 9.5 09/05/2018    MONOPCT 5.4 09/05/2018    BASOPCT 0.8 09/05/2018 MONOSABS 0.6 09/05/2018    LYMPHSABS 1.1 09/05/2018    EOSABS 0.1 09/05/2018    BASOSABS 0.1 09/05/2018     CMP:    Lab Results   Component Value Date     09/09/2018    K 3.7 09/09/2018     09/09/2018    CO2 27 09/09/2018    BUN 16 09/09/2018    CREATININE 1.0 09/09/2018    GFRAA >60 09/09/2018    AGRATIO 1.3 09/05/2018    LABGLOM >60 09/09/2018    GLUCOSE 94 09/09/2018    PROT 7.1 09/05/2018    LABALBU 4.0 09/05/2018    CALCIUM 9.4 09/09/2018    BILITOT 1.3 09/05/2018    ALKPHOS 74 09/05/2018    AST 14 09/05/2018    ALT 8 09/05/2018       Procedure - Total Nail Avulsion, Right Hallux:  I described the procedure to the patient. I obtained oral and written consent. I cleaned her skin with alcohol and then performed a hallux block with 4mls of 2.0% lidocaine plain. I then cleaned her toe with alcohol. I used a sterile scissor to free the soft tissue attachments to the nail. A straight hemostat was used to avulse the right hallux nail in toto. The nail bed was inspected. It was healthy. I then applied a bandaid. The patient is to change this daily until the site is completely healed. I answered all her questions. Impression/Recommendations: The patient is a pleasant 43year old woman with:  1) Contusion, right hallux with damage to nail  - Obtained consent and performed total nail avulsion at the right hallux. The patient tolerated the procedure well  - Will have her follow up within a week of discharge from the hospital.  D/C instructions are in the EMR. 2) Pain, right hallux    Thank you for the opportunity to take part in this patient's care. Please call me with any questions.     Berhane Walton DPM  Office: [phone]  Cell: [phone]

## 2018-09-09 NOTE — PROGRESS NOTES
No results found for: LVEF, LVEFMODE     Conclusions   Echo 9/6/18   Summary   Technically difficult examination secondary to habitus. Left ventricular systolic function is normal with ejection fraction   estimated at 55%. No regional wall motion abnormalities. Left ventricular cavity size is mildly dilated. There is moderate concentric left ventricular hypertrophy. Grade II diastolic dysfunction with elevated left ventricular filling   pressure. Moderate bi-atrial enlargement. The right ventricle is mildly enlarged. Mild tricuspid regurgitation. Systolic pulmonary artery pressure (SPAP) is normal estimated at 36 mmHg   (Right atrial pressure of 8 mmHg). Compared to exam done 7/28/2017, left ventricle, left atrium, right   ventricle, and right atrium sizes have all increased.        renal artery duplex 9/7/18   Summary 1. Within the limits of this exam, there is no evidence to suggest renal   artery stenosis bilaterally. The ostial and proximal bilateral renal   arteries were not well seen therefore significant ostial stenoses can not be   definitively ruled out.   2. Resistive indexes in the bilateral upper and lower poles are within   normal limits.   3. Further radiographic evaluation may be needed. Principal Problem:    Hypertensive emergency  Active Problems:    Dyspnea    Edema    Acute on chronic diastolic heart failure (HCC)    Polycystic ovarian disease    Obesity    Anemia    Acute congestive heart failure (HCC)    Acute pulmonary edema (HCC)    Hypertension due to endocrine disorder  Resolved Problems:    * No resolved hospital problems.  *      Assessment/Plan:  ~ Hypertension- emergency- improving blood pressure control -160's   - on labetalol 200mg BID   - Spironolactone (aldactone) 50mg daily   - clonidine 0.1mg TID    ~Acute on chronic diastolic heart failure   - Admission weight 320lbs?--> 304lbs-->296lbs>291lbs-->290lbs   - net negative 12L since admission with 2.1L UOP last 24 hours   - will continue lasix 40mg PO daily at discharge   - repeat BMP in 1 week to 10 days as ordered by nephrology   - continue daily weights, strict I/O's   - low salt diet    ~Obesity  ~Anemia  Okay for discharge from cardiology perspective with follow up as planned 9/21/18  Palmer Negrete CNP, 9/9/2018, 9:13 AM

## 2018-09-10 ENCOUNTER — TELEPHONE (OUTPATIENT)
Dept: PULMONOLOGY | Age: 38
End: 2018-09-10

## 2018-09-10 ENCOUNTER — TELEPHONE (OUTPATIENT)
Dept: CARDIAC REHAB | Age: 38
End: 2018-09-10

## 2018-09-10 NOTE — TELEPHONE ENCOUNTER
----- Message from Ba Vital MD sent at 9/10/2018  9:18 AM EDT -----      ----- Message -----  From: Nedra Adkins MD  Sent: 9/9/2018   3:53 PM  To: Ba Vital MD

## 2018-09-11 NOTE — TELEPHONE ENCOUNTER
First telephone attempt for after hospital follow up; VM left with non urgent CHF nurse call back number. Reminder left in message regarding [facility] 2018 at 1:30 PM with Alia Butler CNP.

## 2018-09-13 NOTE — TELEPHONE ENCOUNTER
Pt returned call to HF nurse. After third voice mail attempt. [address]    SYMPTOM ASSESSMENT: Post discharge follow-up phone call made to pt. She denies shortness of breath, chest pain, edema, or difficulty sleeping; stated she has been feeling \"pretty good but I have been a little dizzy and feeling tired\" since discharge. Discharge weight was 290 lb; today's weight was 270 lb on her home scale. MEDICATION REVIEW: She was able to fill all prescriptions and states she has been taking medications as prescribed. Reviewed patient medications. No discrepancies found. Instructed on need to discuss all her medications with her new pt appointment tomorrow with Horacio Watson. Pt also states she has already took her this morning Lasix dose. Advised pt to hold her diuretic for tomorrow and to have her insurance. FOLLOW-UP APPOINTMENT: Reminded pt of appointment scheduled for tomorrow with Horacio Watson CNP at [address] Transportation is arranged with her mother. Reviewed pt appointments with pt for upcoming pulmonary and cardiology appointments. EDUCATION: Educated pt on sodium restriction of <3000 mg daily and fluid restriction of < 64 ounces daily, the need for daily morning  weights, follow-up, and medication compliance. Reviewed recommended level of activity. Notified pt to call the doctor post discharge if she experiences shortness of breath, chest pain, swelling, cough, or weight gain or loss of three pounds in a day/five pounds in a week. Also notified pt to call the doctor if she feels dizzy, increased fatigue, decreased or difficulty urinating. She verbalized understanding; stated she will call the doctor with any questions or signs of symptom worsening. No additional questions at this time. HF resource number made available for non-urgent questions. RECOMMENDATIONS:  Pt to discuss her symptoms and medications with her new PCP appointment tomorrow.

## 2018-09-14 LAB
ALBUMIN SERPL-MCNC: 4.6 G/DL
ALP BLD-CCNC: 101 U/L
ALT SERPL-CCNC: 17 U/L
ANION GAP SERPL CALCULATED.3IONS-SCNC: NORMAL MMOL/L
AST SERPL-CCNC: 19 U/L
AVERAGE GLUCOSE: 120
BILIRUB SERPL-MCNC: 0.6 MG/DL (ref 0.1–1.4)
BUN BLDV-MCNC: 32 MG/DL
CALCIUM SERPL-MCNC: 10.3 MG/DL
CHLORIDE BLD-SCNC: 102 MMOL/L
CO2: 19 MMOL/L
CREAT SERPL-MCNC: 1.26 MG/DL
GFR CALCULATED: 54
GLUCOSE BLD-MCNC: 112 MG/DL
HBA1C MFR BLD: 5.8 %
POTASSIUM SERPL-SCNC: 5 MMOL/L
SODIUM BLD-SCNC: 139 MMOL/L
TOTAL PROTEIN: 7.7

## 2018-09-20 ENCOUNTER — OFFICE VISIT (OUTPATIENT)
Dept: PULMONOLOGY | Age: 38
End: 2018-09-20

## 2018-09-20 VITALS
BODY MASS INDEX: 43 KG/M2 | OXYGEN SATURATION: 98 % | SYSTOLIC BLOOD PRESSURE: 129 MMHG | DIASTOLIC BLOOD PRESSURE: 76 MMHG | WEIGHT: 274 LBS | RESPIRATION RATE: 16 BRPM | HEIGHT: 67 IN | TEMPERATURE: 99.1 F | HEART RATE: 68 BPM

## 2018-09-20 DIAGNOSIS — G47.10 EXCESSIVE SOMNOLENCE DISORDER: Primary | ICD-10-CM

## 2018-09-20 PROCEDURE — 99213 OFFICE O/P EST LOW 20 MIN: CPT | Performed by: INTERNAL MEDICINE

## 2018-09-20 PROCEDURE — 1036F TOBACCO NON-USER: CPT | Performed by: INTERNAL MEDICINE

## 2018-09-20 PROCEDURE — 1111F DSCHRG MED/CURRENT MED MERGE: CPT | Performed by: INTERNAL MEDICINE

## 2018-09-20 PROCEDURE — G8427 DOCREV CUR MEDS BY ELIG CLIN: HCPCS | Performed by: INTERNAL MEDICINE

## 2018-09-20 PROCEDURE — G8417 CALC BMI ABV UP PARAM F/U: HCPCS | Performed by: INTERNAL MEDICINE

## 2018-09-20 ASSESSMENT — SLEEP AND FATIGUE QUESTIONNAIRES
HOW LIKELY ARE YOU TO NOD OFF OR FALL ASLEEP WHILE SITTING INACTIVE IN A PUBLIC PLACE: 0
HOW LIKELY ARE YOU TO NOD OFF OR FALL ASLEEP WHILE SITTING QUIETLY AFTER LUNCH WITHOUT ALCOHOL: 0
HOW LIKELY ARE YOU TO NOD OFF OR FALL ASLEEP WHILE WATCHING TV: 0
HOW LIKELY ARE YOU TO NOD OFF OR FALL ASLEEP WHEN YOU ARE A PASSENGER IN A CAR FOR AN HOUR WITHOUT A BREAK: 0
NECK CIRCUMFERENCE (INCHES): 15
HOW LIKELY ARE YOU TO NOD OFF OR FALL ASLEEP WHILE LYING DOWN TO REST IN THE AFTERNOON WHEN CIRCUMSTANCES PERMIT: 2
HOW LIKELY ARE YOU TO NOD OFF OR FALL ASLEEP WHILE SITTING AND TALKING TO SOMEONE: 0
HOW LIKELY ARE YOU TO NOD OFF OR FALL ASLEEP WHILE SITTING AND READING: 0
ESS TOTAL SCORE: 2
HOW LIKELY ARE YOU TO NOD OFF OR FALL ASLEEP IN A CAR, WHILE STOPPED FOR A FEW MINUTES IN TRAFFIC: 0

## 2018-09-21 ENCOUNTER — TELEPHONE (OUTPATIENT)
Dept: CARDIOLOGY CLINIC | Age: 38
End: 2018-09-21

## 2018-09-21 ENCOUNTER — OFFICE VISIT (OUTPATIENT)
Dept: CARDIOLOGY CLINIC | Age: 38
End: 2018-09-21

## 2018-09-21 VITALS
HEIGHT: 67 IN | WEIGHT: 275.6 LBS | DIASTOLIC BLOOD PRESSURE: 80 MMHG | SYSTOLIC BLOOD PRESSURE: 126 MMHG | HEART RATE: 70 BPM | BODY MASS INDEX: 43.26 KG/M2 | OXYGEN SATURATION: 99 %

## 2018-09-21 DIAGNOSIS — E66.01 CLASS 3 SEVERE OBESITY DUE TO EXCESS CALORIES WITH SERIOUS COMORBIDITY AND BODY MASS INDEX (BMI) OF 40.0 TO 44.9 IN ADULT (HCC): ICD-10-CM

## 2018-09-21 DIAGNOSIS — D50.9 IRON DEFICIENCY ANEMIA, UNSPECIFIED IRON DEFICIENCY ANEMIA TYPE: ICD-10-CM

## 2018-09-21 DIAGNOSIS — I10 HTN (HYPERTENSION), MALIGNANT: ICD-10-CM

## 2018-09-21 DIAGNOSIS — I50.32 CHRONIC DIASTOLIC HEART FAILURE (HCC): Primary | ICD-10-CM

## 2018-09-21 DIAGNOSIS — Z79.899 MEDICATION MANAGEMENT: Primary | ICD-10-CM

## 2018-09-21 PROCEDURE — 1111F DSCHRG MED/CURRENT MED MERGE: CPT | Performed by: NURSE PRACTITIONER

## 2018-09-21 PROCEDURE — G8417 CALC BMI ABV UP PARAM F/U: HCPCS | Performed by: NURSE PRACTITIONER

## 2018-09-21 PROCEDURE — G8427 DOCREV CUR MEDS BY ELIG CLIN: HCPCS | Performed by: NURSE PRACTITIONER

## 2018-09-21 PROCEDURE — 1036F TOBACCO NON-USER: CPT | Performed by: NURSE PRACTITIONER

## 2018-09-21 PROCEDURE — 99213 OFFICE O/P EST LOW 20 MIN: CPT | Performed by: NURSE PRACTITIONER

## 2018-09-21 RX ORDER — SPIRONOLACTONE 50 MG/1
50 TABLET, FILM COATED ORAL 2 TIMES DAILY
Qty: 60 TABLET | Refills: 3 | Status: SHIPPED | OUTPATIENT
Start: 2018-09-21 | End: 2018-10-23 | Stop reason: SDUPTHER

## 2018-09-21 ASSESSMENT — ENCOUNTER SYMPTOMS
SHORTNESS OF BREATH: 0
CONSTIPATION: 0
DIARRHEA: 0
EYE PAIN: 0
SORE THROAT: 0
ABDOMINAL PAIN: 0
VOICE CHANGE: 0
EYE ITCHING: 0
EYE DISCHARGE: 0
COUGH: 0
CHOKING: 0

## 2018-09-21 NOTE — PROGRESS NOTES
[address]   Cardiac Evaluation    Primary Care Doctor:  ROYAL Fowler CNP ([facility] [place])    Chief Complaint   Patient presents with    Follow-Up from Hospital     no cardiac complaints    Congestive Heart Failure        History of Present Illness:   I had the pleasure of seeing Dolores Garibay in follow up for recent hospitalization. Dolores Garibay was admitted with shortness of breath and hypertension. CXR with acute pulmonary edema. She was diuresed > 12 liters, weight 320 lbs to 290 lbs. She was treated with IV CCB for hypertension then transitioned to po (Labetolol, spironolactone, clonidine). She has follow up scheduled with nephrology on 11/1/18 Jovani Zaragoza). She is doing good, feeling much better. Her weight at home today was 274 lbs, ranging 2 lbs, lowest was 273 lbs. She is urinating less than at time of discharge. Ankle just started to come back since yesterday. She had blood work done at PCP last Friday (9/14/18), called her and said low kidney function. She denies leg cramping. She is eating foods high in potassium per PCP. A1c was 5.9. She is now able to sleep in bed without orthopnea or PND. Dolores Garibay describes symptoms including fatigue, edema but denies chest pain, dyspnea, palpitations, orthopnea, PND, early saiety, syncope. Past Medical History:   has a past medical history of Hypertension and Obesity. Surgical History:   has no past surgical history on file. Social History:   reports that she has quit smoking. Her smoking use included Cigarettes. She has a 32.00 pack-year smoking history. She quit smokeless tobacco use about 6 years ago. She reports that she does not drink alcohol or use drugs. Family History:   Family History   Problem Relation Age of Onset    High Blood Pressure Father        Home Medications:  Prior to Admission medications    Medication Sig Start Date End Date Taking?  Authorizing Provider Summary   Technically difficult examination secondary to habitus. Left ventricular systolic function is normal with ejection fraction estimated at 55%. No regional wall motion abnormalities. Left ventricular cavity size is mildly dilated. There is moderate concentric left ventricular hypertrophy. Grade II diastolic dysfunction with elevated left ventricular filling pressure. Moderate bi-atrial enlargement. The right ventricle is mildly enlarged. Mild tricuspid regurgitation. Systolic pulmonary artery pressure (SPAP) is normal estimated at 36 mmHg (Right atrial pressure of 8 mmHg). Compared to exam done 7/28/2017, left ventricle, left atrium, right ventricle, and right atrium sizes have all increased. renal artery duplex 9/7/18   Summary    1. Within the limits of this exam, there is no evidence to suggest renal artery stenosis bilaterally. The ostial and proximal bilateral renal arteries were not well seen therefore significant ostial stenoses can not be definitively  ruled out. 2. Resistive indexes in the bilateral upper and lower poles are within normal limits. 3. Further radiographic evaluation may be needed. Assessment:    1. Chronic diastolic heart failure ([address]    2. HTN (hypertension), malignant    3. Iron deficiency anemia, unspecified iron deficiency anemia type    4. Class 3 severe obesity due to excess calories with serious comorbidity and body mass index (BMI) of 40.0 to 44.9 in adult [facility])          Plan:   1. No change in medicines at this time  2. Will get blood test results from PCP  3. Follow up with me in 1 month      I appreciate the opportunity of cooperating in the care of this individual.    Sahil Parker CNP, 9/21/2018, 1:48 PM    QUALITY MEASURES  1. Tobacco Cessation Counseling: NA  2. Retake of BP if >140/90:   NA  3. Documentation to PCP/referring for new patient:  Sent to PCP at close of office visit  4.  CAD patient on anti-platelet: NA  5. CAD patient on STATIN therapy:  NA  6.  Patient with CHF and aFib on anticoagulation:  NA

## 2018-09-21 NOTE — PATIENT INSTRUCTIONS
1. No change in medicines at this time  2. Will get blood test results from PCP  3.  Follow up with me in 1 month

## 2018-09-21 NOTE — TELEPHONE ENCOUNTER
Hemoglobin A1C   Order: [id number]   Status:  Final result   Visible to patient:  No (Not Released)   Notes recorded by ROYAL Mayo CNP on 9/21/2018 at 2:59 PM EDT  Potassium on high side of normal. Dami Spoon function a bit worse but not drastic.    Let's cut back the spironolactone to 50 mg once daily instead of twice. Repeat BMP in 2 weeks.   Thank you, Iona     Left message to call

## 2018-09-21 NOTE — PROGRESS NOTES
Pulmonary Outpatient Note   Maribel Euceda MD       9/20/2018    Chief Complaint:  Follow-Up from Hospital     HPI:   43y.o. year old female here for further evaluation of possible DARYL. Has occasional snoring at night, had daytime fatigue and somnolence but she feels this is better since hypertension addressment. No prior sleep study testing. Feels refreshed in the AM when wakening. Denies napping during the day. Past Medical History:   Diagnosis Date    Hypertension     Obesity        No past surgical history on file. Social History   Substance Use Topics    Smoking status: Former Smoker     Packs/day: 2.00     Years: 16.00     Types: Cigarettes    Smokeless tobacco: Former User     Quit date: 9/5/2012      Comment: e-cig x 5 years    Alcohol use No       Family History   Problem Relation Age of Onset    High Blood Pressure Father          Current Outpatient Prescriptions:     cloNIDine (CATAPRES) 0.1 MG tablet, Take 1 tablet by mouth 3 times daily, Disp: 60 tablet, Rfl: 3    ferrous sulfate 325 (65 Fe) MG tablet, Take 1 tablet by mouth 3 times daily (with meals), Disp: 30 tablet, Rfl: 3    furosemide (LASIX) 40 MG tablet, Take 1 tablet by mouth daily, Disp: 60 tablet, Rfl: 3    labetalol (NORMODYNE) 200 MG tablet, Take 1 tablet by mouth every 12 hours, Disp: 60 tablet, Rfl: 3    metFORMIN (GLUCOPHAGE) 500 MG tablet, Take 1 tablet by mouth 2 times daily (with meals), Disp: 60 tablet, Rfl: 3    spironolactone (ALDACTONE) 50 MG tablet, Take 1 tablet by mouth 2 times daily, Disp: 30 tablet, Rfl: 3    Patient has no known allergies. Vitals:    09/20/18 1331   BP: 129/76   Pulse: 68   Resp: 16   Temp: 99.1 °F (37.3 °C)   TempSrc: Oral   SpO2: 98%   Weight: 274 lb (124.3 kg)   Height: 5' 7\" (1.702 m)       Review of Systems   Constitutional: Negative for chills, fever and unexpected weight change. HENT: Negative for mouth sores, sore throat and voice change.     Eyes: Negative for pain, discharge and itching. Respiratory: Negative for cough, choking and shortness of breath. Cardiovascular: Negative for chest pain, palpitations and leg swelling. Gastrointestinal: Negative for abdominal pain, constipation and diarrhea. Endocrine: Negative for cold intolerance, heat intolerance and polydipsia. Genitourinary: Negative for dysuria, frequency and hematuria. Musculoskeletal: Negative for gait problem, joint swelling and neck stiffness. Neurological: Negative for dizziness, numbness and headaches. Psychiatric/Behavioral: Negative for agitation, confusion and hallucinations. Physical Exam   Constitutional: She appears well-developed and well-nourished. No distress. HENT:   Head: Normocephalic and atraumatic. Mouth/Throat: Oropharynx is clear and moist. No oropharyngeal exudate. Eyes: Pupils are equal, round, and reactive to light. EOM are normal.   Neck: Neck supple. No JVD present. Cardiovascular: Normal heart sounds. Exam reveals no gallop and no friction rub. No murmur heard. Pulmonary/Chest: Effort normal. She has no wheezes. She has no rales. Equal chest rise and expansion bilaterally   Abdominal: Soft. Bowel sounds are normal. She exhibits no distension. There is no tenderness. Musculoskeletal: Normal range of motion. She exhibits no edema. Lymphadenopathy:     She has no cervical adenopathy. Neurological: She is alert. No cranial nerve deficit. CN 2-12 grossly intact   Skin: Skin is warm and dry. No rash noted. She is not diaphoretic. ASSESSMENT:    1. Excessive somnolence disorder      PLAN:    -Overall her symptoms are quite minimal with respect to DARYL, however she has comorbid hypertension that has been difficult to control in additional to other risk factors. I discussed a screening sleep study to check for underlying DARYL, as treatment may assist with her cardiac status.  She declined consideration.   -Counseled on sleep hygiene, weight

## 2018-10-23 ENCOUNTER — HOSPITAL ENCOUNTER (OUTPATIENT)
Age: 38
Discharge: HOME | End: 2018-10-23
Payer: MEDICAID

## 2018-10-23 ENCOUNTER — OFFICE VISIT (OUTPATIENT)
Dept: CARDIOLOGY CLINIC | Age: 38
End: 2018-10-23
Payer: MEDICAID

## 2018-10-23 VITALS
DIASTOLIC BLOOD PRESSURE: 78 MMHG | HEIGHT: 67 IN | HEART RATE: 77 BPM | BODY MASS INDEX: 44.13 KG/M2 | WEIGHT: 281.2 LBS | OXYGEN SATURATION: 98 % | SYSTOLIC BLOOD PRESSURE: 110 MMHG

## 2018-10-23 DIAGNOSIS — I10 HTN (HYPERTENSION), MALIGNANT: ICD-10-CM

## 2018-10-23 DIAGNOSIS — I50.32 CHRONIC DIASTOLIC HEART FAILURE (HCC): ICD-10-CM

## 2018-10-23 DIAGNOSIS — I50.32 CHRONIC DIASTOLIC HEART FAILURE (HCC): Primary | ICD-10-CM

## 2018-10-23 DIAGNOSIS — E66.01 CLASS 3 SEVERE OBESITY DUE TO EXCESS CALORIES WITH SERIOUS COMORBIDITY AND BODY MASS INDEX (BMI) OF 40.0 TO 44.9 IN ADULT (HCC): ICD-10-CM

## 2018-10-23 DIAGNOSIS — D50.9 IRON DEFICIENCY ANEMIA, UNSPECIFIED IRON DEFICIENCY ANEMIA TYPE: ICD-10-CM

## 2018-10-23 LAB
ANION GAP SERPL CALCULATED.3IONS-SCNC: 12 MMOL/L (ref 3–16)
BUN BLDV-MCNC: 21 MG/DL (ref 7–20)
CALCIUM SERPL-MCNC: 10 MG/DL (ref 8.3–10.6)
CHLORIDE BLD-SCNC: 99 MMOL/L (ref 99–110)
CO2: 27 MMOL/L (ref 21–32)
CREAT SERPL-MCNC: 1.2 MG/DL (ref 0.6–1.1)
GFR AFRICAN AMERICAN: >60
GFR NON-AFRICAN AMERICAN: 50
GLUCOSE BLD-MCNC: 164 MG/DL (ref 70–99)
POTASSIUM SERPL-SCNC: 4.8 MMOL/L (ref 3.5–5.1)
PRO-BNP: 213 PG/ML (ref 0–124)
SODIUM BLD-SCNC: 138 MMOL/L (ref 136–145)

## 2018-10-23 PROCEDURE — G8427 DOCREV CUR MEDS BY ELIG CLIN: HCPCS | Performed by: NURSE PRACTITIONER

## 2018-10-23 PROCEDURE — 80048 BASIC METABOLIC PNL TOTAL CA: CPT

## 2018-10-23 PROCEDURE — 1036F TOBACCO NON-USER: CPT | Performed by: NURSE PRACTITIONER

## 2018-10-23 PROCEDURE — 99213 OFFICE O/P EST LOW 20 MIN: CPT | Performed by: NURSE PRACTITIONER

## 2018-10-23 PROCEDURE — 83880 ASSAY OF NATRIURETIC PEPTIDE: CPT

## 2018-10-23 PROCEDURE — G8417 CALC BMI ABV UP PARAM F/U: HCPCS | Performed by: NURSE PRACTITIONER

## 2018-10-23 PROCEDURE — 36415 COLL VENOUS BLD VENIPUNCTURE: CPT

## 2018-10-23 PROCEDURE — G8482 FLU IMMUNIZE ORDER/ADMIN: HCPCS | Performed by: NURSE PRACTITIONER

## 2018-10-23 RX ORDER — SPIRONOLACTONE 50 MG/1
50 TABLET, FILM COATED ORAL DAILY
Qty: 90 TABLET | Refills: 3 | Status: SHIPPED | OUTPATIENT
Start: 2018-10-23 | End: 2019-11-09 | Stop reason: SDUPTHER

## 2018-10-23 RX ORDER — CLONIDINE HYDROCHLORIDE 0.1 MG/1
0.1 TABLET ORAL 3 TIMES DAILY
Qty: 180 TABLET | Refills: 3 | Status: SHIPPED | OUTPATIENT
Start: 2018-10-23 | End: 2019-09-09 | Stop reason: SDUPTHER

## 2018-10-23 RX ORDER — LABETALOL 200 MG/1
200 TABLET, FILM COATED ORAL 2 TIMES DAILY
Qty: 180 TABLET | Refills: 3 | Status: SHIPPED | OUTPATIENT
Start: 2018-10-23 | End: 2019-11-09 | Stop reason: SDUPTHER

## 2018-10-23 NOTE — LETTER
[address] Cardiology - [facility] [place] [address] [place] [address] [address]  Phone: [phone]  Fax: [phone] [facility], APRN - [facility]        October 25, 2018     ROYAL Blackman - [address] [place] [address]    Patient: Enrique Dasilva  MR Number: [id number]  YOB: 1980  Date of Visit: 10/23/2018    Dear Dr. Zach Baldwin:     Below are the relevant portions of my assessment and plan of care. [address]   Cardiac Evaluation    Primary Care Doctor:  ROYAL Blackman CNP    Chief Complaint   Patient presents with    1 Month Follow-Up     no cardiac complaints        History of Present Illness:   I had the pleasure of seeing Enrique Dasilva in follow up for dCHF, HTN as well as PCOS and HUMBERTO. Since last visit we cut back on spironolactone from 50 mg bid to once daily due to K of 5.0 and azotemia on labs. Her weight at home was 281 lbs, up about 7-8 lbs but she feels is not water weight gain. No swelling. She is moving about more. She is now craving sweets. She has a follow up appointment with PCP next Tuesday with plans to repeat blood work at that time. She has been staying up at night then sleeps throughout the day. She has appointment with Dr. Talisha Hernandez on 11/1/18. Enriuqe Dasilva describes symptoms including fatigue but denies chest pain, dyspnea, palpitations, orthopnea, PND, early saiety, edema, syncope. NYHA:   II  ACC/ AHA Stage:    B    Past Medical History:   has a past medical history of Hypertension and Obesity. Surgical History:   has no past surgical history on file. Social History:   reports that she has quit smoking. Her smoking use included Cigarettes. She has a 32.00 pack-year smoking history. She quit smokeless tobacco use about 6 years ago. She reports that she does not drink alcohol or use drugs.    Family History:   Family History   Problem Relation Age of Onset    High Blood Pressure Father  09/08/2018    CO2 19 09/14/2018    CO2 27 09/09/2018    CO2 27 09/08/2018    PHOS 3.5 09/05/2018    BUN 32 09/14/2018    BUN 16 09/09/2018    BUN 16 09/08/2018    CREATININE 1.26 09/14/2018    CREATININE 1.0 09/09/2018    CREATININE 1.2 09/08/2018     BNP:   Lab Results   Component Value Date    PROBNP 1,733 09/06/2018    PROBNP 4,762 09/05/2018    PROBNP 1,528 07/27/2016        Cardiac Imaging:  Echo 9/6/18   Summary   Technically difficult examination secondary to habitus. Left ventricular systolic function is normal with ejection fraction   estimated at 55%. No regional wall motion abnormalities. Left ventricular cavity size is mildly dilated. There is moderate concentric left ventricular hypertrophy. Grade II diastolic dysfunction with elevated left ventricular filling   pressure. Moderate bi-atrial enlargement. The right ventricle is mildly enlarged. Mild tricuspid regurgitation. Systolic pulmonary artery pressure (SPAP) is normal estimated at 36 mmHg   (Right atrial pressure of 8 mmHg). Compared to exam done 7/28/2017, left ventricle, left atrium, right   ventricle, and right atrium sizes have all increased. renal artery duplex 9/7/18   Summary 1. Within the limits of this exam, there is no evidence to suggest renal   artery stenosis bilaterally. The ostial and proximal bilateral renal   arteries were not well seen therefore significant ostial stenoses can not be   definitively ruled out. 2. Resistive indexes in the bilateral upper and lower poles are within   normal limits. 3. Further radiographic evaluation may be needed. Assessment:    1. Chronic diastolic heart failure ([address]    2. HTN (hypertension), malignant    3. Iron deficiency anemia, unspecified iron deficiency anemia type      Forward blood test results to PCP and Dr. Jasmine Beverly:   1. Continue current heart medicines  2.  Have blood work today - does not need to be fasting

## 2018-10-23 NOTE — PATIENT INSTRUCTIONS
1. Continue current heart medicines  2. Have blood work today - does not need to be fasting  3. Follow up with me in 3 months  4.  Start dedicated exercise (walking, dancing, etc) on a daily basis

## 2018-10-24 ENCOUNTER — TELEPHONE (OUTPATIENT)
Dept: CARDIOLOGY CLINIC | Age: 38
End: 2018-10-24

## 2018-10-25 NOTE — COMMUNICATION BODY
[facility]   Cardiac Evaluation    Primary Care Doctor:  ROYAL Ross CNP    Chief Complaint   Patient presents with    1 Month Follow-Up     no cardiac complaints        History of Present Illness:   I had the pleasure of seeing Myla Cheadle in follow up for dCHF, HTN as well as PCOS and HUMBERTO. Since last visit we cut back on spironolactone from 50 mg bid to once daily due to K of 5.0 and azotemia on labs. Her weight at home was 281 lbs, up about 7-8 lbs but she feels is not water weight gain. No swelling. She is moving about more. She is now craving sweets. She has a follow up appointment with PCP next Tuesday with plans to repeat blood work at that time. She has been staying up at night then sleeps throughout the day. She has appointment with Dr. Fernando Bahena on 11/1/18. Myla Cheadle describes symptoms including fatigue but denies chest pain, dyspnea, palpitations, orthopnea, PND, early saiety, edema, syncope. NYHA:   II  ACC/ AHA Stage:    B    Past Medical History:   has a past medical history of Hypertension and Obesity. Surgical History:   has no past surgical history on file. Social History:   reports that she has quit smoking. Her smoking use included Cigarettes. She has a 32.00 pack-year smoking history. She quit smokeless tobacco use about 6 years ago. She reports that she does not drink alcohol or use drugs. Family History:   Family History   Problem Relation Age of Onset    High Blood Pressure Father        Home Medications:  Prior to Admission medications    Medication Sig Start Date End Date Taking?  Authorizing Provider   spironolactone (ALDACTONE) 50 MG tablet Take 1 tablet by mouth 2 times daily 9/21/18  Yes ROYAL Hansen CNP   cloNIDine (CATAPRES) 0.1 MG tablet Take 1 tablet by mouth 3 times daily 9/9/18  Yes ROYAL Ga CNP   ferrous sulfate 325 (65 Fe) MG tablet Take 1 tablet by mouth 3 times daily (with meals) 9/9/18  Yes expansion without use of accessory muscles  · Resp Auscultation: Normal breath sounds without dullness  Cardiovascular:  · The apical impulses not displaced  · Heart tones are crisp and normal  · JVP 8 cm H2O  · Regular rate and rhythm, normal S1S2, no m/g/r  · Peripheral pulses are symmetrical and full  · There is no clubbing, cyanosis of the extremities.   · No BLE edema  · Pedal Pulses: 2+ and equal   Abdomen:  · No masses or tenderness  · Liver/Spleen: No Abnormalities Noted  Neurological/Psychiatric:  · Alert and oriented in all spheres  · Moves all extremities well  · Exhibits normal gait balance and coordination  · No abnormalities of mood, affect, memory, mentation, or behavior are noted    Lab Data:    CBC:   Lab Results   Component Value Date    WBC 11.8 09/06/2018    WBC 11.8 09/05/2018    WBC 10.7 07/28/2016    RBC 4.57 09/06/2018    RBC 4.66 09/05/2018    RBC 4.68 07/28/2016    HGB 9.8 09/06/2018    HGB 9.6 09/05/2018    HGB 13.7 07/28/2016    HCT 32.6 09/06/2018    HCT 33.3 09/05/2018    HCT 40.6 07/28/2016    MCV 71.2 09/06/2018    MCV 71.3 09/05/2018    MCV 86.8 07/28/2016    RDW 22.1 09/06/2018    RDW 22.9 09/05/2018    RDW 14.8 07/28/2016     09/06/2018     09/05/2018     07/28/2016     BMP:  Lab Results   Component Value Date     09/14/2018     09/09/2018     09/08/2018    K 5.0 09/14/2018    K 3.7 09/09/2018    K 3.5 09/08/2018    K 4.2 09/06/2018     09/14/2018     09/09/2018     09/08/2018    CO2 19 09/14/2018    CO2 27 09/09/2018    CO2 27 09/08/2018    PHOS 3.5 09/05/2018    BUN 32 09/14/2018    BUN 16 09/09/2018    BUN 16 09/08/2018    CREATININE 1.26 09/14/2018    CREATININE 1.0 09/09/2018    CREATININE 1.2 09/08/2018     BNP:   Lab Results   Component Value Date    PROBNP 1,733 09/06/2018    PROBNP 4,762 09/05/2018    PROBNP 1,528 07/27/2016        Cardiac Imaging:  Echo 9/6/18   Summary   Technically difficult examination secondary Patient with CHF and aFib on anticoagulation:  NA

## 2019-09-09 ENCOUNTER — TELEPHONE (OUTPATIENT)
Dept: CARDIOLOGY CLINIC | Age: 39
End: 2019-09-09

## 2019-09-09 RX ORDER — CLONIDINE HYDROCHLORIDE 0.1 MG/1
TABLET ORAL
Qty: 90 TABLET | Refills: 3 | Status: SHIPPED | OUTPATIENT
Start: 2019-09-09

## 2019-12-31 ENCOUNTER — HOSPITAL ENCOUNTER (INPATIENT)
Age: 39
LOS: 2 days | Discharge: HOME | DRG: 720 | End: 2020-01-02
Attending: EMERGENCY MEDICINE | Admitting: FAMILY MEDICINE
Payer: MEDICAID

## 2019-12-31 ENCOUNTER — APPOINTMENT (OUTPATIENT)
Dept: GENERAL RADIOLOGY | Age: 39
DRG: 720 | End: 2019-12-31
Payer: MEDICAID

## 2019-12-31 ENCOUNTER — APPOINTMENT (OUTPATIENT)
Dept: CT IMAGING | Age: 39
DRG: 720 | End: 2019-12-31
Payer: MEDICAID

## 2019-12-31 PROBLEM — J96.01 ACUTE RESPIRATORY FAILURE WITH HYPOXIA (HCC): Status: ACTIVE | Noted: 2019-12-31

## 2019-12-31 PROBLEM — A41.9 SEPSIS (HCC): Status: ACTIVE | Noted: 2019-12-31

## 2019-12-31 PROBLEM — J18.9 PNEUMONIA: Status: ACTIVE | Noted: 2019-12-31

## 2019-12-31 PROBLEM — I10 HTN (HYPERTENSION): Status: ACTIVE | Noted: 2019-12-31

## 2019-12-31 LAB
A/G RATIO: 1.1 (ref 1.1–2.2)
ALBUMIN SERPL-MCNC: 4 G/DL (ref 3.4–5)
ALP BLD-CCNC: 75 U/L (ref 40–129)
ALT SERPL-CCNC: 19 U/L (ref 10–40)
ANION GAP SERPL CALCULATED.3IONS-SCNC: 13 MMOL/L (ref 3–16)
AST SERPL-CCNC: 22 U/L (ref 15–37)
BASE EXCESS VENOUS: -0.5 MMOL/L (ref -3–3)
BASOPHILS ABSOLUTE: 0 K/UL (ref 0–0.2)
BASOPHILS RELATIVE PERCENT: 0.3 %
BILIRUB SERPL-MCNC: 0.6 MG/DL (ref 0–1)
BUN BLDV-MCNC: 11 MG/DL (ref 7–20)
CALCIUM SERPL-MCNC: 8.8 MG/DL (ref 8.3–10.6)
CARBOXYHEMOGLOBIN: 2.1 % (ref 0–1.5)
CHLORIDE BLD-SCNC: 100 MMOL/L (ref 99–110)
CO2: 24 MMOL/L (ref 21–32)
CREAT SERPL-MCNC: 1 MG/DL (ref 0.6–1.1)
D DIMER: 490 NG/ML DDU (ref 0–229)
EOSINOPHILS ABSOLUTE: 0 K/UL (ref 0–0.6)
EOSINOPHILS RELATIVE PERCENT: 0.2 %
GFR AFRICAN AMERICAN: >60
GFR NON-AFRICAN AMERICAN: >60
GLOBULIN: 3.5 G/DL
GLUCOSE BLD-MCNC: 155 MG/DL (ref 70–99)
HCG QUALITATIVE: NEGATIVE
HCO3 VENOUS: 24.7 MMOL/L (ref 23–29)
HCT VFR BLD CALC: 37.7 % (ref 36–48)
HEMOGLOBIN: 12.2 G/DL (ref 12–16)
LYMPHOCYTES ABSOLUTE: 0.4 K/UL (ref 1–5.1)
LYMPHOCYTES RELATIVE PERCENT: 7.1 %
MCH RBC QN AUTO: 25.9 PG (ref 26–34)
MCHC RBC AUTO-ENTMCNC: 32.3 G/DL (ref 31–36)
MCV RBC AUTO: 80.1 FL (ref 80–100)
METHEMOGLOBIN VENOUS: 0.7 %
MONOCYTES ABSOLUTE: 0.4 K/UL (ref 0–1.3)
MONOCYTES RELATIVE PERCENT: 6.9 %
NEUTROPHILS ABSOLUTE: 5.2 K/UL (ref 1.7–7.7)
NEUTROPHILS RELATIVE PERCENT: 85.5 %
O2 CONTENT, VEN: 13 VOL %
O2 SAT, VEN: 72 %
O2 THERAPY: ABNORMAL
PCO2, VEN: 42.7 MMHG (ref 40–50)
PDW BLD-RTO: 17.5 % (ref 12.4–15.4)
PH VENOUS: 7.38 (ref 7.35–7.45)
PLATELET # BLD: 161 K/UL (ref 135–450)
PMV BLD AUTO: 9 FL (ref 5–10.5)
PO2, VEN: 39 MMHG (ref 25–40)
POTASSIUM REFLEX MAGNESIUM: 3.6 MMOL/L (ref 3.5–5.1)
PRO-BNP: 1991 PG/ML (ref 0–124)
RAPID INFLUENZA  B AGN: NEGATIVE
RAPID INFLUENZA A AGN: NEGATIVE
RBC # BLD: 4.7 M/UL (ref 4–5.2)
SODIUM BLD-SCNC: 137 MMOL/L (ref 136–145)
TCO2 CALC VENOUS: 26 MMOL/L
TOTAL PROTEIN: 7.5 G/DL (ref 6.4–8.2)
TROPONIN: <0.01 NG/ML
WBC # BLD: 6.1 K/UL (ref 4–11)

## 2019-12-31 PROCEDURE — 80053 COMPREHEN METABOLIC PANEL: CPT

## 2019-12-31 PROCEDURE — 1200000000 HC SEMI PRIVATE

## 2019-12-31 PROCEDURE — 2580000003 HC RX 258: Performed by: STUDENT IN AN ORGANIZED HEALTH CARE EDUCATION/TRAINING PROGRAM

## 2019-12-31 PROCEDURE — 6360000002 HC RX W HCPCS: Performed by: PHYSICIAN ASSISTANT

## 2019-12-31 PROCEDURE — 99285 EMERGENCY DEPT VISIT HI MDM: CPT

## 2019-12-31 PROCEDURE — 87070 CULTURE OTHR SPECIMN AEROBIC: CPT

## 2019-12-31 PROCEDURE — 87804 INFLUENZA ASSAY W/OPTIC: CPT

## 2019-12-31 PROCEDURE — 84703 CHORIONIC GONADOTROPIN ASSAY: CPT

## 2019-12-31 PROCEDURE — 93005 ELECTROCARDIOGRAM TRACING: CPT | Performed by: PHYSICIAN ASSISTANT

## 2019-12-31 PROCEDURE — 87252 VIRUS INOCULATION TISSUE: CPT

## 2019-12-31 PROCEDURE — 85379 FIBRIN DEGRADATION QUANT: CPT

## 2019-12-31 PROCEDURE — 2700000000 HC OXYGEN THERAPY PER DAY

## 2019-12-31 PROCEDURE — 6360000004 HC RX CONTRAST MEDICATION: Performed by: PHYSICIAN ASSISTANT

## 2019-12-31 PROCEDURE — 36415 COLL VENOUS BLD VENIPUNCTURE: CPT

## 2019-12-31 PROCEDURE — 6360000002 HC RX W HCPCS: Performed by: STUDENT IN AN ORGANIZED HEALTH CARE EDUCATION/TRAINING PROGRAM

## 2019-12-31 PROCEDURE — 6370000000 HC RX 637 (ALT 250 FOR IP): Performed by: STUDENT IN AN ORGANIZED HEALTH CARE EDUCATION/TRAINING PROGRAM

## 2019-12-31 PROCEDURE — 87205 SMEAR GRAM STAIN: CPT

## 2019-12-31 PROCEDURE — 6360000002 HC RX W HCPCS

## 2019-12-31 PROCEDURE — 71260 CT THORAX DX C+: CPT

## 2019-12-31 PROCEDURE — 83880 ASSAY OF NATRIURETIC PEPTIDE: CPT

## 2019-12-31 PROCEDURE — 94640 AIRWAY INHALATION TREATMENT: CPT

## 2019-12-31 PROCEDURE — 2580000003 HC RX 258: Performed by: PHYSICIAN ASSISTANT

## 2019-12-31 PROCEDURE — 87040 BLOOD CULTURE FOR BACTERIA: CPT

## 2019-12-31 PROCEDURE — 71046 X-RAY EXAM CHEST 2 VIEWS: CPT

## 2019-12-31 PROCEDURE — 87449 NOS EACH ORGANISM AG IA: CPT

## 2019-12-31 PROCEDURE — 94761 N-INVAS EAR/PLS OXIMETRY MLT: CPT

## 2019-12-31 PROCEDURE — 6370000000 HC RX 637 (ALT 250 FOR IP): Performed by: PHYSICIAN ASSISTANT

## 2019-12-31 PROCEDURE — 85025 COMPLETE CBC W/AUTO DIFF WBC: CPT

## 2019-12-31 PROCEDURE — 84484 ASSAY OF TROPONIN QUANT: CPT

## 2019-12-31 PROCEDURE — 94150 VITAL CAPACITY TEST: CPT

## 2019-12-31 PROCEDURE — 82803 BLOOD GASES ANY COMBINATION: CPT

## 2019-12-31 RX ORDER — IPRATROPIUM BROMIDE AND ALBUTEROL SULFATE 2.5; .5 MG/3ML; MG/3ML
1 SOLUTION RESPIRATORY (INHALATION) ONCE
Status: COMPLETED | OUTPATIENT
Start: 2019-12-31 | End: 2019-12-31

## 2019-12-31 RX ORDER — HYDRALAZINE HYDROCHLORIDE 20 MG/ML
10 INJECTION INTRAMUSCULAR; INTRAVENOUS EVERY 6 HOURS PRN
Status: DISCONTINUED | OUTPATIENT
Start: 2019-12-31 | End: 2020-01-02 | Stop reason: HOSPADM

## 2019-12-31 RX ORDER — FUROSEMIDE 40 MG/1
40 TABLET ORAL DAILY
Status: DISCONTINUED | OUTPATIENT
Start: 2019-12-31 | End: 2020-01-02 | Stop reason: HOSPADM

## 2019-12-31 RX ORDER — LABETALOL 200 MG/1
200 TABLET, FILM COATED ORAL EVERY 12 HOURS SCHEDULED
Status: DISCONTINUED | OUTPATIENT
Start: 2019-12-31 | End: 2019-12-31

## 2019-12-31 RX ORDER — SODIUM CHLORIDE 0.9 % (FLUSH) 0.9 %
10 SYRINGE (ML) INJECTION EVERY 12 HOURS SCHEDULED
Status: DISCONTINUED | OUTPATIENT
Start: 2019-12-31 | End: 2020-01-02 | Stop reason: HOSPADM

## 2019-12-31 RX ORDER — SPIRONOLACTONE 25 MG/1
25 TABLET ORAL ONCE
Status: COMPLETED | OUTPATIENT
Start: 2019-12-31 | End: 2019-12-31

## 2019-12-31 RX ORDER — LABETALOL 200 MG/1
200 TABLET, FILM COATED ORAL 2 TIMES DAILY
Status: DISCONTINUED | OUTPATIENT
Start: 2019-12-31 | End: 2020-01-02 | Stop reason: HOSPADM

## 2019-12-31 RX ORDER — SPIRONOLACTONE 25 MG/1
25 TABLET ORAL DAILY
Status: DISCONTINUED | OUTPATIENT
Start: 2019-12-31 | End: 2019-12-31

## 2019-12-31 RX ORDER — ALBUTEROL SULFATE 2.5 MG/3ML
5 SOLUTION RESPIRATORY (INHALATION) ONCE
Status: DISCONTINUED | OUTPATIENT
Start: 2019-12-31 | End: 2019-12-31

## 2019-12-31 RX ORDER — FUROSEMIDE 10 MG/ML
20 INJECTION INTRAMUSCULAR; INTRAVENOUS ONCE
Status: COMPLETED | OUTPATIENT
Start: 2019-12-31 | End: 2019-12-31

## 2019-12-31 RX ORDER — SPIRONOLACTONE 25 MG/1
50 TABLET ORAL DAILY
Status: DISCONTINUED | OUTPATIENT
Start: 2019-12-31 | End: 2020-01-02 | Stop reason: HOSPADM

## 2019-12-31 RX ORDER — ALBUTEROL SULFATE 2.5 MG/3ML
SOLUTION RESPIRATORY (INHALATION)
Status: COMPLETED
Start: 2019-12-31 | End: 2019-12-31

## 2019-12-31 RX ORDER — NITROGLYCERIN 0.4 MG/1
0.4 TABLET SUBLINGUAL EVERY 5 MIN PRN
Status: DISCONTINUED | OUTPATIENT
Start: 2019-12-31 | End: 2019-12-31

## 2019-12-31 RX ORDER — IPRATROPIUM BROMIDE AND ALBUTEROL SULFATE 2.5; .5 MG/3ML; MG/3ML
1 SOLUTION RESPIRATORY (INHALATION)
Status: DISCONTINUED | OUTPATIENT
Start: 2019-12-31 | End: 2020-01-02 | Stop reason: HOSPADM

## 2019-12-31 RX ORDER — ALBUTEROL SULFATE 2.5 MG/3ML
5 SOLUTION RESPIRATORY (INHALATION) ONCE
Status: COMPLETED | OUTPATIENT
Start: 2019-12-31 | End: 2019-12-31

## 2019-12-31 RX ORDER — CLONIDINE HYDROCHLORIDE 0.1 MG/1
0.1 TABLET ORAL ONCE
Status: COMPLETED | OUTPATIENT
Start: 2019-12-31 | End: 2019-12-31

## 2019-12-31 RX ORDER — 0.9 % SODIUM CHLORIDE 0.9 %
500 INTRAVENOUS SOLUTION INTRAVENOUS ONCE
Status: COMPLETED | OUTPATIENT
Start: 2019-12-31 | End: 2019-12-31

## 2019-12-31 RX ORDER — SODIUM CHLORIDE 0.9 % (FLUSH) 0.9 %
10 SYRINGE (ML) INJECTION PRN
Status: DISCONTINUED | OUTPATIENT
Start: 2019-12-31 | End: 2020-01-02 | Stop reason: HOSPADM

## 2019-12-31 RX ORDER — ACETAMINOPHEN 325 MG/1
650 TABLET ORAL EVERY 4 HOURS PRN
Status: DISCONTINUED | OUTPATIENT
Start: 2019-12-31 | End: 2020-01-02 | Stop reason: HOSPADM

## 2019-12-31 RX ORDER — ONDANSETRON 2 MG/ML
4 INJECTION INTRAMUSCULAR; INTRAVENOUS EVERY 6 HOURS PRN
Status: DISCONTINUED | OUTPATIENT
Start: 2019-12-31 | End: 2020-01-02 | Stop reason: HOSPADM

## 2019-12-31 RX ORDER — CLONIDINE HYDROCHLORIDE 0.1 MG/1
0.1 TABLET ORAL 3 TIMES DAILY
Status: DISCONTINUED | OUTPATIENT
Start: 2019-12-31 | End: 2020-01-02 | Stop reason: HOSPADM

## 2019-12-31 RX ORDER — FERROUS SULFATE 325(65) MG
325 TABLET ORAL
Status: DISCONTINUED | OUTPATIENT
Start: 2019-12-31 | End: 2020-01-02 | Stop reason: HOSPADM

## 2019-12-31 RX ORDER — ESCITALOPRAM OXALATE 10 MG/1
20 TABLET ORAL DAILY
Status: DISCONTINUED | OUTPATIENT
Start: 2019-12-31 | End: 2020-01-02 | Stop reason: HOSPADM

## 2019-12-31 RX ORDER — ALBUTEROL SULFATE 2.5 MG/3ML
2.5 SOLUTION RESPIRATORY (INHALATION)
Status: DISCONTINUED | OUTPATIENT
Start: 2019-12-31 | End: 2020-01-01

## 2019-12-31 RX ADMIN — SPIRONOLACTONE 25 MG: 25 TABLET ORAL at 18:13

## 2019-12-31 RX ADMIN — IOPAMIDOL 75 ML: 755 INJECTION, SOLUTION INTRAVENOUS at 11:01

## 2019-12-31 RX ADMIN — ALBUTEROL SULFATE 5 MG: 2.5 SOLUTION RESPIRATORY (INHALATION) at 08:55

## 2019-12-31 RX ADMIN — FUROSEMIDE 20 MG: 10 INJECTION, SOLUTION INTRAMUSCULAR; INTRAVENOUS at 21:31

## 2019-12-31 RX ADMIN — CEFTRIAXONE SODIUM 1 G: 1 INJECTION, POWDER, FOR SOLUTION INTRAMUSCULAR; INTRAVENOUS at 15:00

## 2019-12-31 RX ADMIN — ALBUTEROL SULFATE 2.5 MG: 2.5 SOLUTION RESPIRATORY (INHALATION) at 10:09

## 2019-12-31 RX ADMIN — SPIRONOLACTONE 25 MG: 25 TABLET ORAL at 11:25

## 2019-12-31 RX ADMIN — ACETAMINOPHEN 650 MG: 325 TABLET ORAL at 14:01

## 2019-12-31 RX ADMIN — LABETALOL HYDROCHLORIDE 200 MG: 200 TABLET, FILM COATED ORAL at 09:16

## 2019-12-31 RX ADMIN — FERROUS SULFATE TAB 325 MG (65 MG ELEMENTAL FE) 325 MG: 325 (65 FE) TAB at 18:13

## 2019-12-31 RX ADMIN — ENOXAPARIN SODIUM 40 MG: 40 INJECTION SUBCUTANEOUS at 18:13

## 2019-12-31 RX ADMIN — SODIUM CHLORIDE 500 ML: 9 INJECTION, SOLUTION INTRAVENOUS at 08:55

## 2019-12-31 RX ADMIN — CLONIDINE HYDROCHLORIDE 0.1 MG: 0.1 TABLET ORAL at 21:30

## 2019-12-31 RX ADMIN — CLONIDINE HYDROCHLORIDE 0.1 MG: 0.1 TABLET ORAL at 08:55

## 2019-12-31 RX ADMIN — IPRATROPIUM BROMIDE AND ALBUTEROL SULFATE 1 AMPULE: .5; 3 SOLUTION RESPIRATORY (INHALATION) at 19:31

## 2019-12-31 RX ADMIN — ACETAMINOPHEN 650 MG: 325 TABLET ORAL at 21:30

## 2019-12-31 RX ADMIN — LABETALOL HYDROCHLORIDE 200 MG: 200 TABLET, FILM COATED ORAL at 21:30

## 2019-12-31 RX ADMIN — AZITHROMYCIN MONOHYDRATE 500 MG: 500 INJECTION, POWDER, LYOPHILIZED, FOR SOLUTION INTRAVENOUS at 13:25

## 2019-12-31 RX ADMIN — IPRATROPIUM BROMIDE AND ALBUTEROL SULFATE 1 AMPULE: .5; 3 SOLUTION RESPIRATORY (INHALATION) at 08:55

## 2019-12-31 RX ADMIN — Medication 10 ML: at 21:31

## 2019-12-31 RX ADMIN — ALBUTEROL SULFATE 5 MG: 2.5 SOLUTION RESPIRATORY (INHALATION) at 10:09

## 2019-12-31 RX ADMIN — ALBUTEROL SULFATE 2.5 MG: 2.5 SOLUTION RESPIRATORY (INHALATION) at 19:31

## 2019-12-31 ASSESSMENT — PAIN DESCRIPTION - PAIN TYPE: TYPE: ACUTE PAIN

## 2019-12-31 ASSESSMENT — PAIN SCALES - GENERAL
PAINLEVEL_OUTOF10: 0
PAINLEVEL_OUTOF10: 6

## 2019-12-31 ASSESSMENT — PAIN DESCRIPTION - LOCATION: LOCATION: BACK

## 2019-12-31 NOTE — ED NOTES
Still have not received patient's medications from pharmacy. This RN called pharmacy and requested to please send aldactone (ordered at 21 [phone]). Pt in CT at this time.       Chrystal Nascimento RN  12/31/19 9704

## 2019-12-31 NOTE — ED NOTES
Bedside report given to Jes from C5. Pt transported to ready admit bed via wheelchair.       Jose Gaytan RN  12/31/19 4052

## 2019-12-31 NOTE — ED NOTES
Breathing tx completed. Pt requested to use bathroom. This RN was stand by assist as pt ambulated to bathroom with steady gait. Urine sample collected. This RN inquired with patient about her \"private/do not announce status\", since there are 2 visitors in her room. Pt states, \"The one is my mom. I WANT her here. The other is my aunt. She is annoying me and I don't want her here, but she is drunk and cannot drive. My mom called someone to pick her up. And even though I don't want her here, my mom brought her back to my room, so she wouldn't get in her car and drive. \" This RN asked if patient wanted to remove the private status and she declined. When pt returned from bathroom, Aunt was still in room. Became agitated with staff, questioning \"WHEN is someone going to pay attention to her and the fact that her legs are huge? \" Pt attempted to calm Aunt, who was then very tearful - grabbing patient's hands, kissing them. Pt rolled her eyes and mouthed to this RN \"I'm sorry\".       Ronna Anderson, RN  12/31/19 1007

## 2019-12-31 NOTE — ED NOTES
Another family member arrived to drive Aunt home, since she is clearly intoxicated. Smells of alcohol and per patient's mother's report, \"is drinking wine out of her water bottle\" and is a known alcoholic. This RN learned from patient's mother that Aunt also caused a scene in main lobby when pt arrived - cursing at registration staff and throwing her keys at the [address]. Aunt sitting in ED lobby, refusing to be transported home by family member. Insisting she is going to drive. And states \"I will just call a cab to bring me back and I will get the car with my spare keys\". Security called by ED registration staff. Jude Ulrich from security arrived and was able to convince Aunt to get into wheelchair in order to be driven home by family member (daughter in law of patient's mother?). Jude Ulrich took pt via w/c to family member's car. When Judeindio Ulrich returned to ED lobby, this RN informed him of make, model and color of patient's car (learned from patient's mother), so he could inform the .       Jose Gaytan RN  12/31/19 1237

## 2019-12-31 NOTE — ED PROVIDER NOTES
[facility]  ED  EMERGENCY DEPARTMENT ENCOUNTER        Pt Name: Blue Haley  MRN: [id number]  [place] 1980  Date of evaluation: 12/31/2019  Provider: ARVIN King  PCP: ROYAL Santillan CNP    This patient was seen and evaluated by the attending physician Lior Kowalski       Chief Complaint   Patient presents with    Cough     cough and fever x 3 days       HISTORY OF PRESENT ILLNESS   (Location/Symptom, Timing/Onset, Context/Setting, Quality, Duration, Modifying Factors, Severity)  Note limiting factors. Blue Haley is a 35 y.o. female who presents here to the emergency department, the patient states that she has been sick for the past 3 days with a cough, fever, generally not feeling well. She has had some difficulty breathing. She states to me that it started 4 days ago and got worse kenan over the last 2 days, and 3 days ago she stopped taking her medications including Lasix, Catapres, Aldactone and labetalol. She has a history of high blood pressure and CHF. She states that she is struggling to breathe at the present time, has been wheezing, unable to lay flat. She says that she has some mild to moderate discomfort. She denies any current nausea or vomiting, she denies any abdominal pain, denies any increased frequency urgency of urination, she denies any actual chest pain or radiation pain into her neck arm or jaw, denies any redness or skin rashes. Nursing Notes were all reviewed and agreed with or any disagreements were addressed in the HPI. REVIEW OF SYSTEMS    (2-9 systems for level 4, 10 or more for level 5)     Review of Systems    Positives and Pertinent negatives as per HPI. Except as noted above in the ROS, all other systems were reviewed and negative. PAST MEDICAL HISTORY     Past Medical History:   Diagnosis Date    Hypertension     Obesity          SURGICAL HISTORY   History reviewed.  No pertinent surgical history. CURRENTMEDICATIONS       Previous Medications    CLONIDINE (CATAPRES) 0.1 MG TABLET    TAKE ONE TABLET BY MOUTH THREE TIMES A DAY    FERROUS SULFATE 325 (65 FE) MG TABLET    Take 1 tablet by mouth 3 times daily (with meals)    FUROSEMIDE (LASIX) 40 MG TABLET    Take 1 tablet by mouth daily    LABETALOL (NORMODYNE) 200 MG TABLET    TAKE ONE TABLET BY MOUTH TWICE A DAY    METFORMIN (GLUCOPHAGE) 500 MG TABLET    Take 1 tablet by mouth 2 times daily (with meals)    SPIRONOLACTONE (ALDACTONE) 50 MG TABLET    TAKE ONE TABLET BY MOUTH DAILY         ALLERGIES     Patient has no known allergies. FAMILYHISTORY       Family History   Problem Relation Age of Onset    High Blood Pressure Father           SOCIAL HISTORY       Social History     Tobacco Use    Smoking status: Former Smoker     Packs/day: 2.00     Years: 16.00     Pack years: 32.00     Types: Cigarettes    Smokeless tobacco: Former User     Quit date: 9/5/2012    Tobacco comment: e-cig x 5 years   Substance Use Topics    Alcohol use: No    Drug use: No       SCREENINGS             PHYSICAL EXAM    (up to 7 for level 4, 8 or more for level 5)     ED Triage Vitals [12/31/19 0803]   BP Temp Temp Source Pulse Resp SpO2 Height Weight   (!) 208/116 98.3 °F (36.8 °C) Oral 111 16 96 % 5' 6\" (1.676 m) (!) 318 lb (144.2 kg)       Physical Exam  Vitals signs and nursing note reviewed. Constitutional:       Appearance: Normal appearance. She is well-developed. She is not diaphoretic. HENT:      Head: Normocephalic and atraumatic. Right Ear: External ear normal.      Left Ear: External ear normal.      Nose: Nose normal.   Eyes:      General:         Right eye: No discharge. Left eye: No discharge. Neck:      Musculoskeletal: Normal range of motion and neck supple. Cardiovascular:      Rate and Rhythm: Regular rhythm. Tachycardia present. Heart sounds: Normal heart sounds. No murmur. No friction rub. No gallop. at:  [address], [facility] [employer]   Phone [phone]   D-DIMER, QUANTITATIVE - Abnormal; Notable for the following components:    D-Dimer, Quant 490 (*)     All other components within normal limits    Narrative:     Performed at:  [address], [facility] [employer]   Phone [phone]   RAPID INFLUENZA A/B ANTIGENS    Narrative:     Performed at:  [place] [address] [employer]   Phone [phone]   RESP VIRUSES DFA PANEL   CULTURE BLOOD #1   CULTURE BLOOD #2   HCG, SERUM, QUALITATIVE    Narrative:     Performed at:  [place] [address] [employer]   Phone [phone]   TROPONIN    Narrative:     Performed at:  [address], [facility] [employer]   Phone [phone]       All other labs were within normal range or not returned as of this dictation. EKG: All EKG's are interpreted by the Emergency Department Physician in the absence of a cardiologist.  Please see their note for interpretation of EKG. RADIOLOGY:   Non-plain film images such as CT, Ultrasound and MRI are read by the radiologist. Plain radiographic images are visualized and preliminarily interpreted by the  ED Provider with the below findings:        Interpretation per the Radiologist below, if available at the time of this note:    CT Chest Pulmonary Embolism W Contrast   Final Result   Scattered nodular infiltrates throughout the left lung and in the right lower   lobe consistent with pneumonia. Reactive mediastinal adenopathy. XR CHEST STANDARD (2 VW)   Final Result   Chronic pulmonary change without acute cardiopulmonary process.            Xr Chest Standard (2 Vw)    Result Date: 12/31/2019  EXAMINATION: TWO XRAY VIEWS OF THE CHEST 12/31/2019 8:15 am

## 2019-12-31 NOTE — ED NOTES
Pt resting in stretcher watching tv. Denies needs at present. Waiting for ready admit bed. At this time, hospital is at capacity and discharge dependent. Pt updated on potential delay in ready bed assignment. Pt voiced understanding - states her father left to get her something to eat.       Bonita Herr RN  12/31/19 5901

## 2019-12-31 NOTE — ED PROVIDER NOTES
I independently performed a history and physical on [employer]. All diagnostic, treatment, and disposition decisions were made by myself in conjunction with the advanced practice provider. For further details of [facility] emergency department encounter, please see ARVIN Kelley's documentation. Patient presents to the emergency department complaining of cough. Patient also reports fevers and chills. Productive cough. Shortness of breath with tachypnea upon arrival.  Patient also reports that she has body aches and has not been taking her medications. Blood pressure upon arrival is greater than 200. Physical exam: General: Moderate respiratory distress. Oropharynx within normal limits. No erythema, or exudate. Tonsils are within normal limits as well. Heart: Tachycardic. Lungs: Mild rales with diffuse rhonchi/wheezing. Tachypnea. Abdomen: Soft. Extremities: No swelling or edema. EKG: Sinus tachycardia with a rate of 105. Normal axis. Normal intervals and durations. LVH noted. Lateral T wave inversion noted. LVH and T wave changes are new compared to previous EKG on September 5, 2018. A/P:   1. COPD exacerbation ([address]    2. Pneumonia due to organism    3.  Hypoxemia             Leland Godoy, DO  12/31/19 [address]81 [facility], DO  01/02/20 3969

## 2019-12-31 NOTE — ED NOTES
First and second set of blood cultures drawn via 2 separate straight sticks from right ac (#1) and right hand (#2). Samples sent to lab.       Ciro Mark RN  12/31/19 9792

## 2019-12-31 NOTE — H&P
No results for input(s): INR in the last 72 hours. Recent Labs     12/31/19  0830   TROPONINI <0.01       Urinalysis:      Lab Results   Component Value Date    NITRU Negative 09/05/2018    WBCUA 6-10 07/27/2016    BACTERIA 3+ 07/27/2016    RBCUA 3-5 07/27/2016    BLOODU Negative 09/05/2018    SPECGRAV <=1.005 09/05/2018    GLUCOSEU Negative 09/05/2018       Radiology:       CT Chest Pulmonary Embolism W Contrast   Final Result   Scattered nodular infiltrates throughout the left lung and in the right lower   lobe consistent with pneumonia. Reactive mediastinal adenopathy. XR CHEST STANDARD (2 VW)   Final Result   Chronic pulmonary change without acute cardiopulmonary process. ASSESSMENT:    Active Hospital Problems    Diagnosis Date Noted    Pneumonia [J18.9] 12/31/2019         PLAN:    Acute respiratory failure 2/2 to sepsis 2/2 to PNA and CHF exacerbation  -A CT showed scattered nodular infiltrates throughout the left lung and in the right lower lobe consistent with pneumonia.     -O2 dropped to 87%on RA during ambulation  -She rec'd a dose of rocephin and azithro in the ED  -Continue rocephin and azithro (started 12/31/19)  -Duonebs q4 while awake  -O2 prn    Sepsis 2/2 to PNA  -A CT showed scattered nodular infiltrates throughout the left lung and in the right lower lobe consistent with pneumonia.    -She rec'd a dose of rocephin and azithro in the ED  -Continue rocephin and azithro (started 12/31/19)  -Duonebs q4 while awake  -O2 prn  -Follow up blood cxs, Sputum Cx, Stre pneumo, legionella, CBC, BMP  -Will not give fluids since pt's proBNP is elevated and she has 2+ pitting edema in the BLLE    CHF exacerbation   -ProBNP 1,991 (previous was 213)  -Trop <0.01  -Restart home meds  -Echo ordered    HTN  -Continue home meds  -Monitor    Depression  -Continue home med lexapro      DVT Prophylaxis: Lovenox  Diet: No diet orders on file  Code Status: Full    PT/OT Eval Status: Not consulted    Dispo - > 2 days    This patient was seen and evaluated with resident team and attending, Dr. Morris Ferguson, DO    Family Medicine Resident PGY2

## 2020-01-01 LAB
ANION GAP SERPL CALCULATED.3IONS-SCNC: 13 MMOL/L (ref 3–16)
BASOPHILS ABSOLUTE: 0 K/UL (ref 0–0.2)
BASOPHILS RELATIVE PERCENT: 0.4 %
BUN BLDV-MCNC: 11 MG/DL (ref 7–20)
CALCIUM SERPL-MCNC: 8.8 MG/DL (ref 8.3–10.6)
CHLORIDE BLD-SCNC: 95 MMOL/L (ref 99–110)
CO2: 23 MMOL/L (ref 21–32)
CREAT SERPL-MCNC: 1 MG/DL (ref 0.6–1.1)
EKG ATRIAL RATE: 105 BPM
EKG DIAGNOSIS: NORMAL
EKG P AXIS: 44 DEGREES
EKG P-R INTERVAL: 150 MS
EKG Q-T INTERVAL: 356 MS
EKG QRS DURATION: 92 MS
EKG QTC CALCULATION (BAZETT): 470 MS
EKG R AXIS: -11 DEGREES
EKG T AXIS: 142 DEGREES
EKG VENTRICULAR RATE: 105 BPM
EOSINOPHILS ABSOLUTE: 0 K/UL (ref 0–0.6)
EOSINOPHILS RELATIVE PERCENT: 0.1 %
GFR AFRICAN AMERICAN: >60
GFR NON-AFRICAN AMERICAN: >60
GLUCOSE BLD-MCNC: 172 MG/DL (ref 70–99)
HCT VFR BLD CALC: 36.1 % (ref 36–48)
HEMOGLOBIN: 11.8 G/DL (ref 12–16)
L. PNEUMOPHILA SEROGP 1 UR AG: NORMAL
LYMPHOCYTES ABSOLUTE: 0.5 K/UL (ref 1–5.1)
LYMPHOCYTES RELATIVE PERCENT: 9 %
MAGNESIUM: 2 MG/DL (ref 1.8–2.4)
MCH RBC QN AUTO: 25.8 PG (ref 26–34)
MCHC RBC AUTO-ENTMCNC: 32.6 G/DL (ref 31–36)
MCV RBC AUTO: 79.3 FL (ref 80–100)
MONOCYTES ABSOLUTE: 0.5 K/UL (ref 0–1.3)
MONOCYTES RELATIVE PERCENT: 8.3 %
NEUTROPHILS ABSOLUTE: 4.8 K/UL (ref 1.7–7.7)
NEUTROPHILS RELATIVE PERCENT: 82.2 %
PDW BLD-RTO: 17.9 % (ref 12.4–15.4)
PLATELET # BLD: 149 K/UL (ref 135–450)
PMV BLD AUTO: 8.9 FL (ref 5–10.5)
POTASSIUM REFLEX MAGNESIUM: 3.5 MMOL/L (ref 3.5–5.1)
RBC # BLD: 4.55 M/UL (ref 4–5.2)
SODIUM BLD-SCNC: 131 MMOL/L (ref 136–145)
STREP PNEUMONIAE ANTIGEN, URINE: NORMAL
WBC # BLD: 5.8 K/UL (ref 4–11)

## 2020-01-01 PROCEDURE — 80048 BASIC METABOLIC PNL TOTAL CA: CPT

## 2020-01-01 PROCEDURE — 94640 AIRWAY INHALATION TREATMENT: CPT

## 2020-01-01 PROCEDURE — 6370000000 HC RX 637 (ALT 250 FOR IP): Performed by: STUDENT IN AN ORGANIZED HEALTH CARE EDUCATION/TRAINING PROGRAM

## 2020-01-01 PROCEDURE — 36415 COLL VENOUS BLD VENIPUNCTURE: CPT

## 2020-01-01 PROCEDURE — 93010 ELECTROCARDIOGRAM REPORT: CPT | Performed by: INTERNAL MEDICINE

## 2020-01-01 PROCEDURE — 85025 COMPLETE CBC W/AUTO DIFF WBC: CPT

## 2020-01-01 PROCEDURE — 83735 ASSAY OF MAGNESIUM: CPT

## 2020-01-01 PROCEDURE — 6360000002 HC RX W HCPCS: Performed by: STUDENT IN AN ORGANIZED HEALTH CARE EDUCATION/TRAINING PROGRAM

## 2020-01-01 PROCEDURE — 1200000000 HC SEMI PRIVATE

## 2020-01-01 PROCEDURE — 2580000003 HC RX 258: Performed by: STUDENT IN AN ORGANIZED HEALTH CARE EDUCATION/TRAINING PROGRAM

## 2020-01-01 RX ORDER — POTASSIUM CHLORIDE 20 MEQ/1
40 TABLET, EXTENDED RELEASE ORAL ONCE
Status: COMPLETED | OUTPATIENT
Start: 2020-01-01 | End: 2020-01-01

## 2020-01-01 RX ADMIN — AZITHROMYCIN MONOHYDRATE 500 MG: 500 INJECTION, POWDER, LYOPHILIZED, FOR SOLUTION INTRAVENOUS at 15:55

## 2020-01-01 RX ADMIN — CLONIDINE HYDROCHLORIDE 0.1 MG: 0.1 TABLET ORAL at 08:50

## 2020-01-01 RX ADMIN — CLONIDINE HYDROCHLORIDE 0.1 MG: 0.1 TABLET ORAL at 15:00

## 2020-01-01 RX ADMIN — FERROUS SULFATE TAB 325 MG (65 MG ELEMENTAL FE) 325 MG: 325 (65 FE) TAB at 15:00

## 2020-01-01 RX ADMIN — IPRATROPIUM BROMIDE AND ALBUTEROL SULFATE 1 AMPULE: .5; 3 SOLUTION RESPIRATORY (INHALATION) at 20:43

## 2020-01-01 RX ADMIN — IPRATROPIUM BROMIDE AND ALBUTEROL SULFATE 1 AMPULE: .5; 3 SOLUTION RESPIRATORY (INHALATION) at 11:28

## 2020-01-01 RX ADMIN — ENOXAPARIN SODIUM 40 MG: 40 INJECTION SUBCUTANEOUS at 08:51

## 2020-01-01 RX ADMIN — LABETALOL HYDROCHLORIDE 200 MG: 200 TABLET, FILM COATED ORAL at 08:51

## 2020-01-01 RX ADMIN — Medication 10 ML: at 20:20

## 2020-01-01 RX ADMIN — IPRATROPIUM BROMIDE AND ALBUTEROL SULFATE 1 AMPULE: .5; 3 SOLUTION RESPIRATORY (INHALATION) at 07:59

## 2020-01-01 RX ADMIN — CEFTRIAXONE SODIUM 1 G: 1 INJECTION, POWDER, FOR SOLUTION INTRAMUSCULAR; INTRAVENOUS at 15:00

## 2020-01-01 RX ADMIN — LABETALOL HYDROCHLORIDE 200 MG: 200 TABLET, FILM COATED ORAL at 20:16

## 2020-01-01 RX ADMIN — SPIRONOLACTONE 50 MG: 25 TABLET ORAL at 08:50

## 2020-01-01 RX ADMIN — POTASSIUM CHLORIDE 40 MEQ: 20 TABLET, EXTENDED RELEASE ORAL at 15:00

## 2020-01-01 RX ADMIN — FUROSEMIDE 40 MG: 40 TABLET ORAL at 08:50

## 2020-01-01 RX ADMIN — IPRATROPIUM BROMIDE AND ALBUTEROL SULFATE 1 AMPULE: .5; 3 SOLUTION RESPIRATORY (INHALATION) at 15:45

## 2020-01-01 RX ADMIN — FERROUS SULFATE TAB 325 MG (65 MG ELEMENTAL FE) 325 MG: 325 (65 FE) TAB at 08:51

## 2020-01-01 RX ADMIN — CLONIDINE HYDROCHLORIDE 0.1 MG: 0.1 TABLET ORAL at 20:16

## 2020-01-01 RX ADMIN — ACETAMINOPHEN 650 MG: 325 TABLET ORAL at 05:29

## 2020-01-01 RX ADMIN — ESCITALOPRAM OXALATE 20 MG: 10 TABLET ORAL at 08:50

## 2020-01-01 RX ADMIN — FERROUS SULFATE TAB 325 MG (65 MG ELEMENTAL FE) 325 MG: 325 (65 FE) TAB at 20:19

## 2020-01-01 ASSESSMENT — PAIN DESCRIPTION - PAIN TYPE: TYPE: ACUTE PAIN

## 2020-01-01 ASSESSMENT — PAIN DESCRIPTION - LOCATION: LOCATION: BACK

## 2020-01-01 ASSESSMENT — PAIN SCALES - GENERAL: PAINLEVEL_OUTOF10: 4

## 2020-01-01 NOTE — PROGRESS NOTES
4 Eyes Skin Assessment     The patient is being assess for   Admission    I agree that 2 RN's have performed a thorough Head to Toe Skin Assessment on the patient. ALL assessment sites listed below have been assessed. Areas assessed by both nurses:   [x]   Head, Face, and Ears   [x]   Shoulders, Back, and Chest, Abdomen  [x]   Arms, Elbows, and Hands   [x]   Coccyx, Sacrum, and Ischium  [x]   Legs, Feet, and Heels        NA    **SHARE this note so that the co-signing nurse is able to place an eSignature**    Co-signer eSignature: Electronically signed by Jonathon Rea RN on 12/31/19 at 6:39 PM    Does the Patient have Skin Breakdown?   No          Quang Prevention initiated:  No   Wound Care Orders initiated:  No      WOC nurse consulted for Pressure Injury (Stage 3,4, Unstageable, DTI, NWPT, Complex wounds)and New or Established Ostomies:  No      Primary Nurse eSignature: Electronically signed by Jr Carey RN on 12/31/19 at 6:39 PM
Dr. Isis Selby informed about possible admit.
pneumonia. Reactive mediastinal adenopathy. XR CHEST STANDARD (2 VW)   Final Result   Chronic pulmonary change without acute cardiopulmonary process. Assessment/Plan:    Active Hospital Problems    Diagnosis Date Noted    Pneumonia [J18.9] 12/31/2019    Sepsis ([facility] [address] [A41.9] 12/31/2019    Acute respiratory failure with hypoxia ([facility] [address] [J96.01] 12/31/2019    HTN (hypertension) [I10] 12/31/2019    Acute on chronic diastolic heart failure (HCC) [I50.33] 09/05/2018     Acute respiratory failure 2/2 to sepsis 2/2 to PNA and CHF exacerbation  -A CT showed scattered nodular infiltrates throughout the left lung and in the right lower lobe consistent with pneumonia.     -O2 dropped to 87%on RA during ambulation  -She rec'd a dose of rocephin and azithro in the ED  -Continue rocephin and azithro (started 12/31/19)  -Duonebs q4 while awake  -O2 prn  -Continue to monitor for improvement     Sepsis 2/2 to PNA  -A CT showed scattered nodular infiltrates throughout the left lung and in the right lower lobe consistent with pneumonia.    -She rec'd a dose of rocephin and azithro in the ED  -Continue rocephin and azithro (started 12/31/19)  -Duonebs q4 while awake  -O2 prn  -Follow up blood cxs, Sputum Cx, Stre pneumo, legionella  -AM CBC, BMP  -Will not give fluids since pt's proBNP is elevated and she has 2+ pitting edema in the BLLE     CHF exacerbation   -ProBNP 1,991 (previous was 213)  -Trop <0.01  -Restart home meds  -Echo ordered, follow up results     HTN  -Continue home meds  -Hydralazine prn  -Monitor     Depression  -Continue home med lexapro    DVT Prophylaxis: lovenox  Diet: DIET LOW SODIUM 2 GM; 2000 ml  Code Status: Full Code  PT/OT Eval Status: Not consulted    Dispo - pending clinical improvement and echo results    This patient was seen and evaluated with resident team and attending, Dr. Jeffy Hardy, DO  Family Medicine Resident PGY2

## 2020-01-02 VITALS
OXYGEN SATURATION: 96 % | SYSTOLIC BLOOD PRESSURE: 154 MMHG | TEMPERATURE: 97.5 F | WEIGHT: 293 LBS | HEART RATE: 67 BPM | HEIGHT: 66 IN | DIASTOLIC BLOOD PRESSURE: 88 MMHG | RESPIRATION RATE: 18 BRPM | BODY MASS INDEX: 47.09 KG/M2

## 2020-01-02 LAB
ANION GAP SERPL CALCULATED.3IONS-SCNC: 11 MMOL/L (ref 3–16)
BASOPHILS ABSOLUTE: 0 K/UL (ref 0–0.2)
BASOPHILS RELATIVE PERCENT: 0.4 %
BUN BLDV-MCNC: 10 MG/DL (ref 7–20)
CALCIUM SERPL-MCNC: 9.2 MG/DL (ref 8.3–10.6)
CHLORIDE BLD-SCNC: 100 MMOL/L (ref 99–110)
CO2: 27 MMOL/L (ref 21–32)
CREAT SERPL-MCNC: 1 MG/DL (ref 0.6–1.1)
CULTURE, RESPIRATORY: NORMAL
EOSINOPHILS ABSOLUTE: 0.1 K/UL (ref 0–0.6)
EOSINOPHILS RELATIVE PERCENT: 2.1 %
GFR AFRICAN AMERICAN: >60
GFR NON-AFRICAN AMERICAN: >60
GLUCOSE BLD-MCNC: 137 MG/DL (ref 70–99)
GRAM STAIN RESULT: NORMAL
HCT VFR BLD CALC: 37.2 % (ref 36–48)
HEMOGLOBIN: 11.9 G/DL (ref 12–16)
LYMPHOCYTES ABSOLUTE: 1.5 K/UL (ref 1–5.1)
LYMPHOCYTES RELATIVE PERCENT: 28.5 %
MCH RBC QN AUTO: 25.9 PG (ref 26–34)
MCHC RBC AUTO-ENTMCNC: 32.1 G/DL (ref 31–36)
MCV RBC AUTO: 80.7 FL (ref 80–100)
MONOCYTES ABSOLUTE: 0.6 K/UL (ref 0–1.3)
MONOCYTES RELATIVE PERCENT: 12.5 %
NEUTROPHILS ABSOLUTE: 2.9 K/UL (ref 1.7–7.7)
NEUTROPHILS RELATIVE PERCENT: 56.5 %
PDW BLD-RTO: 17.4 % (ref 12.4–15.4)
PLATELET # BLD: 178 K/UL (ref 135–450)
PMV BLD AUTO: 9.4 FL (ref 5–10.5)
POTASSIUM REFLEX MAGNESIUM: 4.2 MMOL/L (ref 3.5–5.1)
RBC # BLD: 4.61 M/UL (ref 4–5.2)
SODIUM BLD-SCNC: 138 MMOL/L (ref 136–145)
WBC # BLD: 5.2 K/UL (ref 4–11)

## 2020-01-02 PROCEDURE — 85025 COMPLETE CBC W/AUTO DIFF WBC: CPT

## 2020-01-02 PROCEDURE — 36415 COLL VENOUS BLD VENIPUNCTURE: CPT

## 2020-01-02 PROCEDURE — 6370000000 HC RX 637 (ALT 250 FOR IP): Performed by: STUDENT IN AN ORGANIZED HEALTH CARE EDUCATION/TRAINING PROGRAM

## 2020-01-02 PROCEDURE — 6360000002 HC RX W HCPCS: Performed by: STUDENT IN AN ORGANIZED HEALTH CARE EDUCATION/TRAINING PROGRAM

## 2020-01-02 PROCEDURE — 94640 AIRWAY INHALATION TREATMENT: CPT

## 2020-01-02 PROCEDURE — 2580000003 HC RX 258: Performed by: STUDENT IN AN ORGANIZED HEALTH CARE EDUCATION/TRAINING PROGRAM

## 2020-01-02 PROCEDURE — 80048 BASIC METABOLIC PNL TOTAL CA: CPT

## 2020-01-02 RX ORDER — ALBUTEROL SULFATE 90 UG/1
2 AEROSOL, METERED RESPIRATORY (INHALATION) EVERY 6 HOURS PRN
Qty: 1 INHALER | Refills: 0 | Status: SHIPPED | OUTPATIENT
Start: 2020-01-02

## 2020-01-02 RX ORDER — PREDNISONE 20 MG/1
20 TABLET ORAL 2 TIMES DAILY
Qty: 10 TABLET | Refills: 0 | Status: SHIPPED | OUTPATIENT
Start: 2020-01-02 | End: 2020-01-07

## 2020-01-02 RX ORDER — AZITHROMYCIN 500 MG/1
500 TABLET, FILM COATED ORAL DAILY
Qty: 3 TABLET | Refills: 0 | Status: SHIPPED | OUTPATIENT
Start: 2020-01-02 | End: 2020-01-05

## 2020-01-02 RX ADMIN — AZITHROMYCIN MONOHYDRATE 500 MG: 500 INJECTION, POWDER, LYOPHILIZED, FOR SOLUTION INTRAVENOUS at 13:14

## 2020-01-02 RX ADMIN — Medication 10 ML: at 08:22

## 2020-01-02 RX ADMIN — ENOXAPARIN SODIUM 40 MG: 40 INJECTION SUBCUTANEOUS at 08:22

## 2020-01-02 RX ADMIN — FERROUS SULFATE TAB 325 MG (65 MG ELEMENTAL FE) 325 MG: 325 (65 FE) TAB at 08:21

## 2020-01-02 RX ADMIN — FERROUS SULFATE TAB 325 MG (65 MG ELEMENTAL FE) 325 MG: 325 (65 FE) TAB at 12:16

## 2020-01-02 RX ADMIN — SPIRONOLACTONE 50 MG: 25 TABLET ORAL at 08:21

## 2020-01-02 RX ADMIN — FUROSEMIDE 40 MG: 40 TABLET ORAL at 08:21

## 2020-01-02 RX ADMIN — CLONIDINE HYDROCHLORIDE 0.1 MG: 0.1 TABLET ORAL at 08:22

## 2020-01-02 RX ADMIN — IPRATROPIUM BROMIDE AND ALBUTEROL SULFATE 1 AMPULE: .5; 3 SOLUTION RESPIRATORY (INHALATION) at 11:48

## 2020-01-02 RX ADMIN — Medication 10 ML: at 13:14

## 2020-01-02 RX ADMIN — CEFTRIAXONE SODIUM 1 G: 1 INJECTION, POWDER, FOR SOLUTION INTRAMUSCULAR; INTRAVENOUS at 14:50

## 2020-01-02 RX ADMIN — IPRATROPIUM BROMIDE AND ALBUTEROL SULFATE 1 AMPULE: .5; 3 SOLUTION RESPIRATORY (INHALATION) at 15:51

## 2020-01-02 RX ADMIN — LABETALOL HYDROCHLORIDE 200 MG: 200 TABLET, FILM COATED ORAL at 08:21

## 2020-01-02 RX ADMIN — CLONIDINE HYDROCHLORIDE 0.1 MG: 0.1 TABLET ORAL at 13:57

## 2020-01-02 RX ADMIN — IPRATROPIUM BROMIDE AND ALBUTEROL SULFATE 1 AMPULE: .5; 3 SOLUTION RESPIRATORY (INHALATION) at 07:30

## 2020-01-02 RX ADMIN — ESCITALOPRAM OXALATE 20 MG: 10 TABLET ORAL at 08:22

## 2020-01-02 ASSESSMENT — PAIN SCALES - GENERAL: PAINLEVEL_OUTOF10: 0

## 2020-01-02 NOTE — DISCHARGE INSTR - COC
Continuity of Care Form    Patient Name: Albin Hernández   :  1980  MRN:  [id number]    Admit date:  2019  Discharge date:  ***    Code Status Order: Full Code   Advance Directives:   Advance Care Flowsheet Documentation     Date/Time Healthcare Directive Type of Healthcare Directive Copy in [address] Agent's Name Healthcare Agent's Phone Number    [phone]  No, patient does not have an advance directive for healthcare treatment -- -- -- -- --          Admitting Physician:  Kerry Mims DO  PCP: Collette Melchor, APRN - CNP    Discharging Nurse: [facility] Unit/Room#: 0006/1981-33  Discharging Unit Phone Number: ***    Emergency Contact:   Extended Emergency Contact Information  Primary Emergency Contact: Jeremy Maria Elena Zibby Glimpse of [address] Phone: [phone]  Mobile Phone: [phone]  Relation: Parent  Secondary Emergency Contact: Lakia Lewis, Maria Elena Zibby Glimpse of [address] Phone: [phone]  Relation: Parent    Past Surgical History:  History reviewed. No pertinent surgical history.     Immunization History:   Immunization History   Administered Date(s) Administered    Influenza, Quadv, IM, (6 mo and older Fluzone, Flulaval, Fluarix and 3 yrs and older Afluria) 2018    Tdap (Boostrix, Adacel) 2018       Active Problems:  Patient Active Problem List   Diagnosis Code    Hypertensive urgency I16.0    Dyspnea R06.00    Edema R60.9    Acute on chronic diastolic heart failure (HCC) I50.33    HTN (hypertension), malignant I10    Polycystic ovarian disease E28.2    Obesity E66.9    Anemia D64.9    Acute congestive heart failure (HCC) I50.9    Acute pulmonary edema (HCC) J81.0    Hypertension 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    Pneumonia J18.9    Sepsis ([address] A41.9    Acute respiratory failure with hypoxia ([facility] [address] J96.01    HTN (hypertension) I10 Isolation/Infection:   Isolation          No Isolation        Patient Infection Status     None to display          Nurse Assessment:  Last Vital Signs: BP (!) 154/88   Pulse 67   Temp 97.5 °F (36.4 °C) (Oral)   Resp 18   Ht 5' 6\" (1.676 m)   Wt (!) 318 lb (144.2 kg)   LMP 12/23/2019   SpO2 96%   BMI 51.33 kg/m²     Last documented pain score (0-10 scale): Pain Level: 0  Last Weight:   Wt Readings from Last 1 Encounters:   12/31/19 (!) 318 lb (144.2 kg)     Mental Status:  {IP PT MENTAL STATUS:20030}    IV Access:  { MODE IV ACCESS:256025545}    Nursing Mobility/ADLs:  Walking   {CHP DME OEPB:457899040}  Transfer  {CHP DME VPNA:980679743}  Bathing  {CHP DME DPLI:187317794}  Dressing  {CHP DME AHRB:859139978}  Toileting  {CHP DME JNJR:169587029}  Feeding  {P DME OZOY:999148957}  Med Admin  {P DME SGWO:776048913}  Med Delivery   { MODE MED Delivery:802934513}    Wound Care Documentation and Therapy:        Elimination:  Continence:   · Bowel: {YES / PV:75016}  · Bladder: {YES / HT:76920}  Urinary Catheter: {Urinary Catheter:435208799}   Colostomy/Ileostomy/Ileal Conduit: {YES / OD:20552}       Date of Last BM: ***    Intake/Output Summary (Last 24 hours) at 1/2/2020 1430  Last data filed at 1/2/2020 1308  Gross per 24 hour   Intake 240 ml   Output 100 ml   Net 140 ml     I/O last 3 completed shifts:   In: 360 [P.O.:360]  Out: 100 [Urine:100]    Safety Concerns:     [address] Safety Concerns:993628709}    Impairments/Disabilities:      [address] Impairments/Disabilities:601232276}    Nutrition Therapy:  Current Nutrition Therapy:   [address] Diet List:315148657}    Routes of Feeding: {P DME Other Feedings:606258337}  Liquids: {Slp liquid thickness:94854}  Daily Fluid Restriction: {CHP DME Yes amt example:063211810}  Last Modified Barium Swallow with Video (Video Swallowing Test): {Done Not Done ZRRE:651761213}    Treatments at the Time of Hospital Discharge:   Respiratory Treatments: ***  Oxygen Therapy: {Therapy; copd oxygen:31110}  Ventilator:    {[facility] Vent YZZV:994025155}    Rehab Therapies: {THERAPEUTIC INTERVENTION:1414373363}  Weight Bearing Status/Restrictions: {[facility] Weight Bearin}  Other Medical Equipment (for information only, NOT a DME order):  {EQUIPMENT:113859726}  Other Treatments: ***    Patient's personal belongings (please select all that are sent with patient):  {[facility] DME Belongings:388443587}    RN SIGNATURE:  {Esignature:906549855}    CASE MANAGEMENT/SOCIAL WORK SECTION    Inpatient Status Date: ***    Readmission Risk Assessment Score:  Readmission Risk              Risk of Unplanned Readmission:        11           Discharging to Facility/ Agency   · Name:   · Address:  · Phone:  · Fax:    Dialysis Facility (if applicable)   · Name:  · Address:  · Dialysis Schedule:  · Phone:  · Fax:    / signature: {Esignature:073804275}    PHYSICIAN SECTION    Prognosis: {Prognosis:8303371153}    Condition at Discharge: [address] [place] [address] Patient Condition:393448226}    Rehab Potential (if transferring to Rehab): {Prognosis:9113526351}    Recommended Labs or Other Treatments After Discharge: ***    Physician Certification: I certify the above information and transfer of Farrukh Soto  is necessary for the continuing treatment of the diagnosis listed and that she requires {Admit to Appropriate Level of Care:43967} for {GREATER/LESS:670632761} 30 days.      Update Admission H&P: {CHP DME Changes in SVNI}    PHYSICIAN SIGNATURE:  {Esignature:050067507}

## 2020-01-02 NOTE — FLOWSHEET NOTE
[address] [place] [address] d'c'd from [address] [place] [address]. AC area per hospital policy. DSD applied over insertion site with 2x2 gauze and tegaderm.

## 2020-01-02 NOTE — DISCHARGE SUMMARY
Medication List           Details   azithromycin (ZITHROMAX) 500 MG tablet Take 1 tablet by mouth daily for 3 days  Qty: 3 tablet, Refills: 0      predniSONE (DELTASONE) 20 MG tablet Take 1 tablet by mouth 2 times daily for 5 days  Qty: 10 tablet, Refills: 0      albuterol sulfate HFA (PROVENTIL HFA) 108 (90 Base) MCG/ACT inhaler Inhale 2 puffs into the lungs every 6 hours as needed for Wheezing or Shortness of Breath (Do NOT use after 4 days)  Qty: 1 Inhaler, Refills: 0              Details   spironolactone (ALDACTONE) 50 MG tablet TAKE ONE TABLET BY MOUTH DAILY  Qty: 30 tablet, Refills: 0      labetalol (NORMODYNE) 200 MG tablet TAKE ONE TABLET BY MOUTH TWICE A DAY  Qty: 60 tablet, Refills: 0      cloNIDine (CATAPRES) 0.1 MG tablet TAKE ONE TABLET BY MOUTH THREE TIMES A DAY  Qty: 90 tablet, Refills: 3      ferrous sulfate 325 (65 Fe) MG tablet Take 1 tablet by mouth 3 times daily (with meals)  Qty: 30 tablet, Refills: 3      furosemide (LASIX) 40 MG tablet Take 1 tablet by mouth daily  Qty: 60 tablet, Refills: 3      metFORMIN (GLUCOPHAGE) 500 MG tablet Take 1 tablet by mouth 2 times daily (with meals)  Qty: 60 tablet, Refills: 3           Time Spent on discharge is more than 1 hour in the examination, evaluation, counseling and review of medications and discharge plan. This patient was seen and evaluated with attending Dr. Libby May and resident team.    Signed:  Adelina Beal DO   1/2/2020    Thank you ROYAL Massey CNP for the opportunity to be involved in this patient's care. If you have any questions or concerns please feel free to contact me at [phone].

## 2020-01-02 NOTE — CARE COORDINATION
1/2/20 Chart reviewed, 35 female with health insurance and PCP, pt fairly independent at home, CHF, PNA. Currently on RA, follow for possible o2 needs.

## 2020-01-04 LAB
BLOOD CULTURE, ROUTINE: NORMAL
CULTURE, BLOOD 2: NORMAL

## 2020-01-13 LAB
FINAL REPORT: NORMAL
PRELIMINARY: NORMAL

## 2020-02-25 ENCOUNTER — OFFICE VISIT (OUTPATIENT)
Dept: CARDIOLOGY CLINIC | Age: 40
End: 2020-02-25
Payer: MEDICAID

## 2020-02-25 VITALS
HEART RATE: 72 BPM | OXYGEN SATURATION: 97 % | DIASTOLIC BLOOD PRESSURE: 72 MMHG | BODY MASS INDEX: 45.99 KG/M2 | SYSTOLIC BLOOD PRESSURE: 112 MMHG | WEIGHT: 293 LBS | HEIGHT: 67 IN

## 2020-02-25 PROCEDURE — G8417 CALC BMI ABV UP PARAM F/U: HCPCS | Performed by: NURSE PRACTITIONER

## 2020-02-25 PROCEDURE — G8427 DOCREV CUR MEDS BY ELIG CLIN: HCPCS | Performed by: NURSE PRACTITIONER

## 2020-02-25 PROCEDURE — G8484 FLU IMMUNIZE NO ADMIN: HCPCS | Performed by: NURSE PRACTITIONER

## 2020-02-25 PROCEDURE — 99214 OFFICE O/P EST MOD 30 MIN: CPT | Performed by: NURSE PRACTITIONER

## 2020-02-25 PROCEDURE — 1036F TOBACCO NON-USER: CPT | Performed by: NURSE PRACTITIONER

## 2020-02-25 RX ORDER — SPIRONOLACTONE 50 MG/1
50 TABLET, FILM COATED ORAL DAILY
Qty: 90 TABLET | Refills: 3 | Status: SHIPPED | OUTPATIENT
Start: 2020-02-25

## 2020-02-25 RX ORDER — POTASSIUM CHLORIDE 20 MEQ/1
20 TABLET, EXTENDED RELEASE ORAL DAILY
Qty: 90 TABLET | Refills: 3 | Status: SHIPPED | OUTPATIENT
Start: 2020-02-25

## 2020-02-25 RX ORDER — FUROSEMIDE 40 MG/1
40 TABLET ORAL DAILY
Qty: 90 TABLET | Refills: 3 | Status: SHIPPED | OUTPATIENT
Start: 2020-02-25

## 2020-02-25 RX ORDER — LABETALOL 200 MG/1
200 TABLET, FILM COATED ORAL 2 TIMES DAILY
Qty: 180 TABLET | Refills: 3 | Status: SHIPPED | OUTPATIENT
Start: 2020-02-25

## 2020-02-25 NOTE — PATIENT INSTRUCTIONS
1. Stay on the furosemide (Lasix) 40 mg once daily  2. Okay to take an additional 40 mg if weight up 3 lbs  3. Start potassium 20 mEq once daily  4. No change in the labetalol or clonidine  5. Will get blood test results from PCP  6.  Follow up with me in 3 months

## 2020-02-25 NOTE — PROGRESS NOTES
Appearance: Warm and dry, no apparent distress, normal coloration  HEENT:  Normocephalic, atraumatic  Respiratory:  · Normal excursion and expansion without use of accessory muscles  · Resp Auscultation: Normal breath sounds without dullness  Cardiovascular:  · The apical impulses not displaced  · Heart tones are crisp and normal  · JVP 8-9 cm H2O  · Regular rate and rhythm, normal S1S2, no m/g/r  · Peripheral pulses are symmetrical and full  · There is no clubbing, cyanosis of the extremities.   · 2+ BLE edema, R > L  · Pedal Pulses: 2+ and equal     Abdomen:  · No masses or tenderness, soft, non-distended, neg HJR  · Liver/Spleen: No Abnormalities Noted  Neurological/Psychiatric:  · Alert and oriented in all spheres  · Moves all extremities well  · Exhibits normal gait balance and coordination  · No abnormalities of mood, affect, memory, mentation, or behavior are noted    Lab Data:  CBC:   Lab Results   Component Value Date    WBC 5.2 01/02/2020    WBC 5.8 01/01/2020    WBC 6.1 12/31/2019    RBC 4.61 01/02/2020    RBC 4.55 01/01/2020    RBC 4.70 12/31/2019    HGB 11.9 01/02/2020    HGB 11.8 01/01/2020    HGB 12.2 12/31/2019    HCT 37.2 01/02/2020    HCT 36.1 01/01/2020    HCT 37.7 12/31/2019    MCV 80.7 01/02/2020    MCV 79.3 01/01/2020    MCV 80.1 12/31/2019    RDW 17.4 01/02/2020    RDW 17.9 01/01/2020    RDW 17.5 12/31/2019     01/02/2020     01/01/2020     12/31/2019     BMP:  Lab Results   Component Value Date     01/02/2020     01/01/2020     12/31/2019    K 4.2 01/02/2020    K 3.5 01/01/2020    K 3.6 12/31/2019     01/02/2020    CL 95 01/01/2020     12/31/2019    CO2 27 01/02/2020    CO2 23 01/01/2020    CO2 24 12/31/2019    PHOS 3.5 09/05/2018    BUN 10 01/02/2020    BUN 11 01/01/2020    BUN 11 12/31/2019    CREATININE 1.0 01/02/2020    CREATININE 1.0 01/01/2020    CREATININE 1.0 12/31/2019     BNP:   Lab Results   Component Value Date    PROBNP 1,991 12/31/2019    PROBNP 213 10/23/2018    PROBNP 1,733 09/06/2018     Troponin: No components found for: TROPONIN  Lipids:   Lab Results   Component Value Date    TRIG 46 09/06/2018    HDL 32 09/06/2018    LDLCALC 51 09/06/2018     Cardiac Imaging:  Echo 9/6/18   Summary   Technically difficult examination secondary to habitus. Left ventricular systolic function is normal with ejection fraction estimated at 55%. No regional wall motion abnormalities. Left ventricular cavity size is mildly dilated. There is moderate concentric left ventricular hypertrophy. Grade II diastolic dysfunction with elevated left ventricular filling pressure. Moderate bi-atrial enlargement. The right ventricle is mildly enlarged. Mild tricuspid regurgitation. Systolic pulmonary artery pressure (SPAP) is normal estimated at 36 mmHg (Right atrial pressure of 8 mmHg). Compared to exam done 7/28/2017, left ventricle, left atrium, right ventricle, and right atrium sizes have all increased. renal artery duplex 9/7/18   Summary 1. Within the limits of this exam, there is no evidence to suggest renal   artery stenosis bilaterally. The ostial and proximal bilateral renal   arteries were not well seen therefore significant ostial stenoses can not be   definitively ruled out. 2. Resistive indexes in the bilateral upper and lower poles are within   normal limits. 3. Further radiographic evaluation may be needed. Assessment:    1. Chronic diastolic heart failure ([address]    2. HTN (hypertension), malignant    3. Polycystic ovarian disease          Plan:   1. Stay on the furosemide (Lasix) 40 mg once daily  2. Okay to take an additional 40 mg if weight up 3 lbs (encouraged to weight self daily)  3. Start potassium 20 mEq once daily  4. No change in the labetalol or clonidine (refilled)  5. Will get blood test results from PCP  6.  Follow up with me in 3 months       I appreciate the opportunity of cooperating in the care of

## 2020-02-25 NOTE — LETTER
Medication Sig Start Date End Date Taking? Authorizing Provider   albuterol sulfate HFA (PROVENTIL HFA) 108 (90 Base) MCG/ACT inhaler Inhale 2 puffs into the lungs every 6 hours as needed for Wheezing or Shortness of Breath (Do NOT use after 4 days) 1/2/20  Yes Tracy Batch, DO   spironolactone (ALDACTONE) 50 MG tablet TAKE ONE TABLET BY MOUTH DAILY 11/12/19  Yes Myrlene Kehr, APRN - CNP   labetalol (NORMODYNE) 200 MG tablet TAKE ONE TABLET BY MOUTH TWICE A DAY 11/12/19  Yes Myrlene Kehr, APRN - CNP   cloNIDine (CATAPRES) 0.1 MG tablet TAKE ONE TABLET BY MOUTH THREE TIMES A DAY 9/9/19  Yes Myrlene Kehr, APRN - CNP   ferrous sulfate 325 (65 Fe) MG tablet Take 1 tablet by mouth 3 times daily (with meals) 9/9/18  Yes ROYAL Whitfield CNP   furosemide (LASIX) 40 MG tablet Take 1 tablet by mouth daily 9/10/18  Yes ROYAL Whitfield CNP   metFORMIN (GLUCOPHAGE) 500 MG tablet Take 1 tablet by mouth 2 times daily (with meals) 9/9/18  Yes ROYAL Whitfield CNP        Allergies:  Patient has no known allergies. Review of Systems:   · Constitutional: there has been no unanticipated weight loss. · Eyes: No vision changes  · ENT: No Headaches, no nasal congestion. No mouth sores or sore throat. · Cardiovascular: Reviewed in HPI  · Respiratory: No cough or wheezing, no sputum production. · Gastrointestinal: No abdominal pain, no constipation or diarrhea  · Genitourinary: No dysuria, trouble voiding, or hematuria. · Musculoskeletal:  No weakness or joint complaints. · Integumentary: No rash or pruritis. · Neurological: No numbness or tingling. No weakness. No tremor. · Psychiatric: No anxiety, no depression. · Endocrine:  No excessive thirst or urination. · Hematologic/Lymphatic: No abnormal bruising or bleeding, blood clots or swollen lymph nodes.       Physical Examination:    Vitals:    02/25/20 1434   BP: 112/72   Pulse: 72   SpO2: 97%   Weight: (!) 311 lb (141.1 kg) Height: 5' 7\" (1.702 m)        Constitutional and General Appearance: Warm and dry, no apparent distress, normal coloration  HEENT:  Normocephalic, atraumatic  Respiratory:  · Normal excursion and expansion without use of accessory muscles  · Resp Auscultation: Normal breath sounds without dullness  Cardiovascular:  · The apical impulses not displaced  · Heart tones are crisp and normal  · JVP 8-9 cm H2O  · Regular rate and rhythm, normal S1S2, no m/g/r  · Peripheral pulses are symmetrical and full  · There is no clubbing, cyanosis of the extremities.   · 2+ BLE edema, R > L  · Pedal Pulses: 2+ and equal     Abdomen:  · No masses or tenderness, soft, non-distended, neg HJR  · Liver/Spleen: No Abnormalities Noted  Neurological/Psychiatric:  · Alert and oriented in all spheres  · Moves all extremities well  · Exhibits normal gait balance and coordination  · No abnormalities of mood, affect, memory, mentation, or behavior are noted    Lab Data:  CBC:   Lab Results   Component Value Date    WBC 5.2 01/02/2020    WBC 5.8 01/01/2020    WBC 6.1 12/31/2019    RBC 4.61 01/02/2020    RBC 4.55 01/01/2020    RBC 4.70 12/31/2019    HGB 11.9 01/02/2020    HGB 11.8 01/01/2020    HGB 12.2 12/31/2019    HCT 37.2 01/02/2020    HCT 36.1 01/01/2020    HCT 37.7 12/31/2019    MCV 80.7 01/02/2020    MCV 79.3 01/01/2020    MCV 80.1 12/31/2019    RDW 17.4 01/02/2020    RDW 17.9 01/01/2020    RDW 17.5 12/31/2019     01/02/2020     01/01/2020     12/31/2019     BMP:  Lab Results   Component Value Date     01/02/2020     01/01/2020     12/31/2019    K 4.2 01/02/2020    K 3.5 01/01/2020    K 3.6 12/31/2019     01/02/2020    CL 95 01/01/2020     12/31/2019    CO2 27 01/02/2020    CO2 23 01/01/2020    CO2 24 12/31/2019    PHOS 3.5 09/05/2018    BUN 10 01/02/2020    BUN 11 01/01/2020    BUN 11 12/31/2019    CREATININE 1.0 01/02/2020    CREATININE 1.0 01/01/2020    CREATININE 1.0 12/31/2019 BNP:   Lab Results   Component Value Date    PROBNP 1,991 12/31/2019    PROBNP 213 10/23/2018    PROBNP 1,733 09/06/2018     Troponin: No components found for: TROPONIN  Lipids:   Lab Results   Component Value Date    TRIG 46 09/06/2018    HDL 32 09/06/2018    LDLCALC 51 09/06/2018     Cardiac Imaging:  Echo 9/6/18   Summary   Technically difficult examination secondary to habitus. Left ventricular systolic function is normal with ejection fraction estimated at 55%. No regional wall motion abnormalities. Left ventricular cavity size is mildly dilated. There is moderate concentric left ventricular hypertrophy. Grade II diastolic dysfunction with elevated left ventricular filling pressure. Moderate bi-atrial enlargement. The right ventricle is mildly enlarged. Mild tricuspid regurgitation. Systolic pulmonary artery pressure (SPAP) is normal estimated at 36 mmHg (Right atrial pressure of 8 mmHg). Compared to exam done 7/28/2017, left ventricle, left atrium, right ventricle, and right atrium sizes have all increased. renal artery duplex 9/7/18   Summary 1. Within the limits of this exam, there is no evidence to suggest renal   artery stenosis bilaterally. The ostial and proximal bilateral renal   arteries were not well seen therefore significant ostial stenoses can not be   definitively ruled out. 2. Resistive indexes in the bilateral upper and lower poles are within   normal limits. 3. Further radiographic evaluation may be needed. Assessment:    1. Chronic diastolic heart failure ([address]    2. HTN (hypertension), malignant    3. Polycystic ovarian disease          Plan:   1. Stay on the furosemide (Lasix) 40 mg once daily  2. Okay to take an additional 40 mg if weight up 3 lbs (encouraged to weight self daily)  3. Start potassium 20 mEq once daily  4. No change in the labetalol or clonidine (refilled)  5. Will get blood test results from PCP  6.  Follow up with me in 3 months I appreciate the opportunity of cooperating in the care of this individual.    Luzma Parra CNP, 2/25/2020, 2:45 PM    QUALITY MEASURES  1. Tobacco Cessation Counseling: NA  2. Retake of BP if >140/90:   NA  3. Documentation to PCP/referring for new patient:  Sent to PCP at close of office visit  4. CAD patient on anti-platelet: NA  5. CAD patient on STATIN therapy:  NA  6.  Patient with CHF and aFib on anticoagulation:  NA     Records requested

## 2020-02-27 LAB
ALBUMIN SERPL-MCNC: 4.4 G/DL
ALP BLD-CCNC: 92 U/L
ALT SERPL-CCNC: 24 U/L
ANION GAP SERPL CALCULATED.3IONS-SCNC: 1.5 MMOL/L
AST SERPL-CCNC: 22 U/L
AVERAGE GLUCOSE: 160
BILIRUB SERPL-MCNC: 0.5 MG/DL (ref 0.1–1.4)
BUN BLDV-MCNC: 16 MG/DL
CALCIUM SERPL-MCNC: 9.5 MG/DL
CHLORIDE BLD-SCNC: 102 MMOL/L
CHOLESTEROL, TOTAL: 166 MG/DL
CHOLESTEROL/HDL RATIO: NORMAL
CO2: 25 MMOL/L
CREAT SERPL-MCNC: 1.22 MG/DL
GFR CALCULATED: 56
GLUCOSE BLD-MCNC: 163 MG/DL
HBA1C MFR BLD: 7.2 %
HDLC SERPL-MCNC: 41 MG/DL (ref 35–70)
LDL CHOLESTEROL CALCULATED: 108 MG/DL (ref 0–160)
POTASSIUM SERPL-SCNC: 4.2 MMOL/L
SODIUM BLD-SCNC: 140 MMOL/L
TOTAL PROTEIN: 7.4
TRIGL SERPL-MCNC: 86 MG/DL
VLDLC SERPL CALC-MCNC: 17 MG/DL

## 2020-02-28 ENCOUNTER — TELEPHONE (OUTPATIENT)
Dept: CARDIOLOGY CLINIC | Age: 40
End: 2020-02-28

## 2020-11-03 PROBLEM — I10 HTN (HYPERTENSION): Status: RESOLVED | Noted: 2019-12-31 | Resolved: 2020-11-03

## 2021-01-21 ENCOUNTER — HOSPITAL ENCOUNTER (OUTPATIENT)
Age: 41
Discharge: HOME | End: 2021-01-21
Payer: MEDICAID

## 2021-01-21 LAB
ALBUMIN SERPL-MCNC: 4.2 G/DL (ref 3.4–5)
ANION GAP SERPL CALCULATED.3IONS-SCNC: 10 MMOL/L (ref 3–16)
BASOPHILS ABSOLUTE: 0.1 K/UL (ref 0–0.2)
BASOPHILS RELATIVE PERCENT: 0.6 %
BILIRUBIN URINE: NEGATIVE
BLOOD, URINE: ABNORMAL
BUN BLDV-MCNC: 18 MG/DL (ref 7–20)
CALCIUM SERPL-MCNC: 10 MG/DL (ref 8.3–10.6)
CHLORIDE BLD-SCNC: 102 MMOL/L (ref 99–110)
CLARITY: CLEAR
CO2: 25 MMOL/L (ref 21–32)
COLOR: YELLOW
CREAT SERPL-MCNC: 1.1 MG/DL (ref 0.6–1.1)
CREATININE URINE: 187.6 MG/DL (ref 28–259)
EOSINOPHILS ABSOLUTE: 0.1 K/UL (ref 0–0.6)
EOSINOPHILS RELATIVE PERCENT: 0.8 %
EPITHELIAL CELLS, UA: ABNORMAL /HPF (ref 0–5)
GFR AFRICAN AMERICAN: >60
GFR NON-AFRICAN AMERICAN: 55
GLUCOSE BLD-MCNC: 144 MG/DL (ref 70–99)
GLUCOSE URINE: NEGATIVE MG/DL
HCT VFR BLD CALC: 46.1 % (ref 36–48)
HEMOGLOBIN: 14 G/DL (ref 12–16)
HYALINE CASTS: ABNORMAL /LPF (ref 0–2)
KETONES, URINE: NEGATIVE MG/DL
LEUKOCYTE ESTERASE, URINE: NEGATIVE
LYMPHOCYTES ABSOLUTE: 1 K/UL (ref 1–5.1)
LYMPHOCYTES RELATIVE PERCENT: 10 %
MCH RBC QN AUTO: 22.5 PG (ref 26–34)
MCHC RBC AUTO-ENTMCNC: 30.4 G/DL (ref 31–36)
MCV RBC AUTO: 74 FL (ref 80–100)
MICROSCOPIC EXAMINATION: YES
MONOCYTES ABSOLUTE: 0.7 K/UL (ref 0–1.3)
MONOCYTES RELATIVE PERCENT: 7.5 %
MUCUS: ABNORMAL /LPF
NEUTROPHILS ABSOLUTE: 8 K/UL (ref 1.7–7.7)
NEUTROPHILS RELATIVE PERCENT: 81.1 %
NITRITE, URINE: NEGATIVE
PDW BLD-RTO: 19.7 % (ref 12.4–15.4)
PH UA: 5.5 (ref 5–8)
PHOSPHORUS: 3.5 MG/DL (ref 2.5–4.9)
PLATELET # BLD: 200 K/UL (ref 135–450)
PMV BLD AUTO: 9.8 FL (ref 5–10.5)
POTASSIUM SERPL-SCNC: 4.4 MMOL/L (ref 3.5–5.1)
PROTEIN PROTEIN: 83 MG/DL
PROTEIN UA: 100 MG/DL
PROTEIN/CREAT RATIO: 0.4 MG/DL
RBC # BLD: 6.23 M/UL (ref 4–5.2)
RBC UA: ABNORMAL /HPF (ref 0–4)
SODIUM BLD-SCNC: 137 MMOL/L (ref 136–145)
SPECIFIC GRAVITY UA: >=1.03 (ref 1–1.03)
URINE TYPE: ABNORMAL
UROBILINOGEN, URINE: 0.2 E.U./DL
VITAMIN D 25-HYDROXY: 15.6 NG/ML
WBC # BLD: 9.9 K/UL (ref 4–11)
WBC UA: ABNORMAL /HPF (ref 0–5)

## 2021-01-21 PROCEDURE — 80069 RENAL FUNCTION PANEL: CPT

## 2021-01-21 PROCEDURE — 85025 COMPLETE CBC W/AUTO DIFF WBC: CPT

## 2021-01-21 PROCEDURE — 82570 ASSAY OF URINE CREATININE: CPT

## 2021-01-21 PROCEDURE — 82088 ASSAY OF ALDOSTERONE: CPT

## 2021-01-21 PROCEDURE — 81001 URINALYSIS AUTO W/SCOPE: CPT

## 2021-01-21 PROCEDURE — 84244 ASSAY OF RENIN: CPT

## 2021-01-21 PROCEDURE — 36415 COLL VENOUS BLD VENIPUNCTURE: CPT

## 2021-01-21 PROCEDURE — 84156 ASSAY OF PROTEIN URINE: CPT

## 2021-01-21 PROCEDURE — 82306 VITAMIN D 25 HYDROXY: CPT

## 2021-01-22 LAB — ALDOSTERONE: 28.4 NG/DL

## 2021-01-25 LAB — RENIN ACTIVITY: 6 NG/ML/HR

## 2022-06-15 NOTE — CONSULTS
years    Alcohol use No    Drug use: No    Sexual activity: No     Other Topics Concern    Not on file     Social History Narrative    No narrative on file       Family History   Problem Relation Age of Onset    High Blood Pressure Father           Medication:     Scheduled Meds:   sodium chloride flush  10 mL Intravenous 2 times per day    enoxaparin  40 mg Subcutaneous Daily    famotidine (PEPCID) injection  20 mg Intravenous BID    furosemide  40 mg Intravenous BID    spironolactone  25 mg Oral Daily     Continuous Infusions:   niCARdipine       PRN Meds:.sodium chloride flush, ondansetron, magnesium hydroxide       Vitals :     Vitals:    09/05/18 1526   BP:    Pulse:    Resp: 22   Temp: 97.5 °F (36.4 °C)   SpO2: 98%       I & O :       Intake/Output Summary (Last 24 hours) at 09/05/18 1622  Last data filed at 09/05/18 1439   Gross per 24 hour   Intake                0 ml   Output             2750 ml   Net            -2750 ml        Physical Examination :     General appearance: Anxious- yes, distressed- no, in good spirits- no    Lethargic, somnolent, morbidly obese, edematous, and has facial hair  Growth which is significant  HEENT: Lips- normal, teeth- ok , oral mucosa- moist  Neck : Mass- no, appears symmetrical, JVD- not visible  Respiratory: Respiratory effort-  Labored on RA, wheeze- no, crackles - YES  Cardiovascular:  Ausculation- No M/R/G, Edema 4+  Abdomen: visible mass- no, distention- no, scar- no, tenderness- no                            hepatosplenomegaly-  no  Musculoskeletal:  clubbing no,cyanosis- no , digital ischemia- no                           muscle strength- grossly normal , tone - grossly normal  Skin: rashes- no , ulcers- no, induration- no, tightening - no-- hair growth  Psychiatric:  Judgement and insight- normal           AAO X 3     LABS:     Recent Labs      09/05/18   0934   WBC  11.8*   HGB  9.6*   HCT  33.3*   PLT  244     Recent Labs      09/05/18   0934   NA  [address]

## 2025-08-05 ENCOUNTER — HOSPITAL ENCOUNTER (OUTPATIENT)
Dept: MAMMOGRAPHY | Age: 45
Discharge: HOME | End: 2025-08-10
Payer: MEDICAID

## 2025-08-05 VITALS — WEIGHT: 293 LBS | BODY MASS INDEX: 47.09 KG/M2 | HEIGHT: 66 IN

## 2025-08-05 DIAGNOSIS — Z12.31 VISIT FOR SCREENING MAMMOGRAM: ICD-10-CM

## 2025-08-05 PROCEDURE — 77067 SCR MAMMO BI INCL CAD: CPT
